# Patient Record
Sex: FEMALE | Race: BLACK OR AFRICAN AMERICAN | NOT HISPANIC OR LATINO | Employment: FULL TIME | URBAN - METROPOLITAN AREA
[De-identification: names, ages, dates, MRNs, and addresses within clinical notes are randomized per-mention and may not be internally consistent; named-entity substitution may affect disease eponyms.]

---

## 2023-05-08 DIAGNOSIS — I10 ACCELERATED HYPERTENSION: Primary | ICD-10-CM

## 2023-05-10 RX ORDER — METOPROLOL TARTRATE 25 MG/1
TABLET, FILM COATED ORAL
Qty: 180 TABLET | Refills: 1 | Status: SHIPPED | OUTPATIENT
Start: 2023-05-10

## 2023-05-10 RX ORDER — SPIRONOLACTONE 50 MG/1
TABLET, FILM COATED ORAL
Qty: 180 TABLET | Refills: 1 | Status: SHIPPED | OUTPATIENT
Start: 2023-05-10 | End: 2024-01-26 | Stop reason: SDUPTHER

## 2023-05-10 RX ORDER — HYDROCHLOROTHIAZIDE 50 MG/1
TABLET ORAL
Qty: 90 TABLET | Refills: 1 | Status: SHIPPED | OUTPATIENT
Start: 2023-05-10 | End: 2024-01-31 | Stop reason: SDUPTHER

## 2023-07-05 ENCOUNTER — PATIENT OUTREACH (OUTPATIENT)
Dept: PRIMARY CARE | Facility: CLINIC | Age: 63
End: 2023-07-05
Payer: COMMERCIAL

## 2023-07-05 NOTE — PROGRESS NOTES
Discharge Facility: [place] [address]  Discharge Diagnosis: Nausea/Vomiting  Admission Date: 7-2-2023  Discharge Date: 7-3-2023    PCP Appointment Date: 7-  Specialist Appointment Date: NA  Hospital Encounter and Summary: Linked   See discharge assessment below for further details    Engagement  Call Start Time: 1105 (7/5/2023 11:05 AM)    Medications  Medications reviewed with patient/caregiver?: Yes (No changes to her medication regime) (7/5/2023 11:05 AM)  Is the patient having any side effects they believe may be caused by any medication additions or changes?: No (7/5/2023 11:05 AM)  Does the patient have all medications ordered at discharge?: Not applicable (7/5/2023 11:05 AM)  Prescription Comments: No issues reported in regards to accessibility affordability adherence  Denies any  questions or concerns about their medications once they are home. Verbalizes an understanding regarding how to take their medications and which medications they should be taking.  Denies the need for any medication refills at this time. (7/5/2023 11:05 AM)  Is the patient taking all medications as directed (includes completed medication regime)?: Yes (7/5/2023 11:05 AM)  Care Management Interventions: Provided patient education (7/5/2023 11:05 AM)  Medication Comments: Patient denies any questions related to her medications. (7/5/2023 11:05 AM)    Appointments  Does the patient have a primary care provider?: Yes (confirmed PCP Dr. Albert Hodgson) (7/5/2023 11:05 AM)  Care Management Interventions: Verified appointment date/time/provider (scheduled for July 11) (7/5/2023 11:05 AM)  Has the patient kept scheduled appointments due by today?: Yes (7/5/2023 11:05 AM)  Care Management Interventions: Educated on importance of keeping appointment (Emphasized the importance of hospital follow up. This is a great way for you to connect with your provider to ensure you have safely transitioned home.If you have any questions or concerns prior to  "appointment, please call the PCP's office right away.) (2023 11:05 AM)    Self Management  What is the home health agency?: NA (2023 11:05 AM)  Has home health visited the patient within 72 hours of discharge?: Not applicable (2023 11:05 AM)  What Durable Medical Equipment (DME) was ordered?: Patient states they have  all the necessary equipment and supplies at home.  Patient has an insuling pump- diabetc supplies (2023 11:05 AM)    Patient Teaching  Does the patient have access to their discharge instructions?: No (at work) (2023 11:05 AM)  Care Management Interventions: Reviewed instructions with patient (2023 11:05 AM)  What is the patient's perception of their health status since discharge?: Improving (\"slowly\") (2023 11:05 AM)  Is the patient/caregiver able to teach back the hierarchy of who to call/visit for symptoms/problems? PCP, Specialist, Home Health nurse, Urgent Care, ED, 911: Yes (2023 11:05 AM)    Wrap Up  Wrap Up Additional Comments: Spoke w/ pt  Pt identified by name and . states she is improving \"slowly\"  continues with some nausea.  Blood sugar this morning on the low side 70. She had some yogurt and blood sugar appropriate now.  Denies feelings of hyper/ hypoglycemia.  States she has no additional questions related to gastroparesis diet -- small meals, avoid high-fat and high-fiber diet. Confirms she will attend the follow up appointment that was scheduled with her today. (2023 11:05 AM)  Call End Time: 1116 (2023 11:05 AM)          "

## 2023-07-06 ENCOUNTER — TELEMEDICINE (OUTPATIENT)
Dept: PRIMARY CARE | Facility: CLINIC | Age: 63
End: 2023-07-06
Payer: COMMERCIAL

## 2023-07-06 DIAGNOSIS — E11.22 TYPE 2 DIABETES MELLITUS WITH STAGE 3B CHRONIC KIDNEY DISEASE, WITH LONG-TERM CURRENT USE OF INSULIN (MULTI): ICD-10-CM

## 2023-07-06 DIAGNOSIS — N18.30 STAGE 3 CHRONIC KIDNEY DISEASE, UNSPECIFIED WHETHER STAGE 3A OR 3B CKD (MULTI): ICD-10-CM

## 2023-07-06 DIAGNOSIS — R10.84 GENERALIZED ABDOMINAL PAIN: ICD-10-CM

## 2023-07-06 DIAGNOSIS — R93.1 AGATSTON CORONARY ARTERY CALCIUM SCORE BETWEEN 200 AND 399: ICD-10-CM

## 2023-07-06 DIAGNOSIS — Z96.41 INSULIN PUMP IN PLACE: ICD-10-CM

## 2023-07-06 DIAGNOSIS — Z79.4 TYPE 2 DIABETES MELLITUS WITH STAGE 3B CHRONIC KIDNEY DISEASE, WITH LONG-TERM CURRENT USE OF INSULIN (MULTI): ICD-10-CM

## 2023-07-06 DIAGNOSIS — N18.32 TYPE 2 DIABETES MELLITUS WITH STAGE 3B CHRONIC KIDNEY DISEASE, WITH LONG-TERM CURRENT USE OF INSULIN (MULTI): ICD-10-CM

## 2023-07-06 DIAGNOSIS — E78.2 MIXED HYPERLIPIDEMIA: Primary | ICD-10-CM

## 2023-07-06 DIAGNOSIS — I10 BENIGN ESSENTIAL HYPERTENSION: ICD-10-CM

## 2023-07-06 PROBLEM — E78.5 HYPERLIPIDEMIA: Status: ACTIVE | Noted: 2023-07-06

## 2023-07-06 PROBLEM — N18.9 CKD (CHRONIC KIDNEY DISEASE): Status: ACTIVE | Noted: 2023-07-06

## 2023-07-06 PROBLEM — D64.9 ANEMIA: Status: ACTIVE | Noted: 2023-07-06

## 2023-07-06 PROBLEM — N25.81 SECONDARY HYPERPARATHYROIDISM OF RENAL ORIGIN (MULTI): Status: ACTIVE | Noted: 2023-07-06

## 2023-07-06 PROBLEM — E11.9 DIABETES MELLITUS (MULTI): Status: ACTIVE | Noted: 2023-07-06

## 2023-07-06 PROBLEM — R10.9 ABDOMINAL PAIN: Status: ACTIVE | Noted: 2023-07-06

## 2023-07-06 PROCEDURE — 99214 OFFICE O/P EST MOD 30 MIN: CPT | Performed by: INTERNAL MEDICINE

## 2023-07-06 RX ORDER — ATORVASTATIN CALCIUM 80 MG/1
80 TABLET, FILM COATED ORAL DAILY
COMMUNITY
Start: 2018-01-09 | End: 2024-01-26 | Stop reason: SDUPTHER

## 2023-07-06 RX ORDER — EMPAGLIFLOZIN 25 MG/1
25 TABLET, FILM COATED ORAL DAILY
COMMUNITY
Start: 2020-03-03 | End: 2024-01-26 | Stop reason: SDUPTHER

## 2023-07-06 RX ORDER — SEMAGLUTIDE 1.34 MG/ML
1 INJECTION, SOLUTION SUBCUTANEOUS
COMMUNITY
Start: 2018-06-28 | End: 2023-07-06 | Stop reason: SDUPTHER

## 2023-07-06 RX ORDER — SEMAGLUTIDE 2.68 MG/ML
2 INJECTION, SOLUTION SUBCUTANEOUS
COMMUNITY
Start: 2021-10-28 | End: 2023-10-11 | Stop reason: SDUPTHER

## 2023-07-06 RX ORDER — ALBUTEROL SULFATE 90 UG/1
1 AEROSOL, METERED RESPIRATORY (INHALATION) EVERY 6 HOURS PRN
COMMUNITY
Start: 2022-09-01 | End: 2024-01-26 | Stop reason: SDUPTHER

## 2023-07-06 ASSESSMENT — ENCOUNTER SYMPTOMS
EYE ITCHING: 0
TROUBLE SWALLOWING: 0
EYE DISCHARGE: 0
RHINORRHEA: 0
FATIGUE: 0
EYE REDNESS: 0
EYE REDNESS: 0
POLYPHAGIA: 0
DYSURIA: 0
MYALGIAS: 0
FREQUENCY: 0
HEADACHES: 1
STRIDOR: 0
CHEST TIGHTNESS: 0
TROUBLE SWALLOWING: 0
HEMATURIA: 0
SORE THROAT: 0
FLANK PAIN: 0
EYE ITCHING: 0
VOMITING: 1
BRUISES/BLEEDS EASILY: 0
JOINT SWELLING: 0
LIGHT-HEADEDNESS: 0
FACIAL ASYMMETRY: 0
ARTHRALGIAS: 0
VOICE CHANGE: 0
LIGHT-HEADEDNESS: 0
NECK PAIN: 0
PHOTOPHOBIA: 0
ABDOMINAL DISTENTION: 0
DYSURIA: 0
NECK STIFFNESS: 0
SPEECH DIFFICULTY: 0
COLOR CHANGE: 0
SEIZURES: 0
ADENOPATHY: 0
SEIZURES: 0
VOICE CHANGE: 0
FACIAL ASYMMETRY: 0
POLYDIPSIA: 0
ABDOMINAL PAIN: 1
SHORTNESS OF BREATH: 0
NUMBNESS: 0
PALPITATIONS: 0
EYE PAIN: 0
ARTHRALGIAS: 0
WOUND: 0
DIARRHEA: 0
DIARRHEA: 0
BLOOD IN STOOL: 0
CHOKING: 0
DIAPHORESIS: 0
WHEEZING: 0
POLYDIPSIA: 0
BLOOD IN STOOL: 0
JOINT SWELLING: 0
CONSTIPATION: 1
ANAL BLEEDING: 0
SINUS PRESSURE: 0
PALPITATIONS: 0
RECTAL PAIN: 0
BRUISES/BLEEDS EASILY: 0
BACK PAIN: 1
DIAPHORESIS: 0
CHILLS: 0
WOUND: 0
ACTIVITY CHANGE: 0
APPETITE CHANGE: 0
ADENOPATHY: 0
NECK PAIN: 0
DIZZINESS: 0
ABDOMINAL DISTENTION: 0
STRIDOR: 0
RHINORRHEA: 0
FREQUENCY: 0
HEMATURIA: 0
PHOTOPHOBIA: 0
DIFFICULTY URINATING: 0
POLYPHAGIA: 0
SHORTNESS OF BREATH: 0
FACIAL SWELLING: 0
TREMORS: 0
CHILLS: 0
SINUS PAIN: 0
COUGH: 0
TREMORS: 0
COUGH: 0
FATIGUE: 0
WHEEZING: 0
DIZZINESS: 0
SPEECH DIFFICULTY: 0
VOMITING: 0
COLOR CHANGE: 0
ABDOMINAL PAIN: 0
SLEEP DISTURBANCE: 0
SINUS PRESSURE: 0
DIFFICULTY URINATING: 0
APPETITE CHANGE: 0
CHOKING: 0
NAUSEA: 1
SLEEP DISTURBANCE: 0
MYALGIAS: 0
HEADACHES: 1
WEAKNESS: 0
NAUSEA: 1
BACK PAIN: 1
SINUS PAIN: 0
ACTIVITY CHANGE: 0
EYE DISCHARGE: 0
RECTAL PAIN: 0
NECK STIFFNESS: 0
EYE PAIN: 0
CONSTIPATION: 1
ANAL BLEEDING: 0
NUMBNESS: 0
SORE THROAT: 0
FLANK PAIN: 0
FACIAL SWELLING: 0
WEAKNESS: 0
CHEST TIGHTNESS: 0

## 2023-07-06 NOTE — PROGRESS NOTES
"Patient: Anna Santos  : 1960  PCP: Albert Hodgson MD  MRN: [id number]  Program: No linked episodes     Anna Santos is a 62 y.o. female presenting today for follow-up after being discharged from the hospital 3 days ago. The main problem requiring admission was nausea/vomiting and abdominal pain. The discharge summary and/or Transitional Care Management documentation was reviewed. Medication reconciliation was performed as indicated via the \"Woody as Reviewed\" timestamp.     Anna Santos was contacted by Transitional Care Management services two days after her discharge. This encounter and supporting documentation was reviewed.    The complexity of medical decision making for this patient's transitional care is high.  Patient presents for posthospitalization visit.  She was [facility] with abdominal pain, nausea vomiting and dizziness.  Her work-up was negative including abdominal pelvic CT.  Her symptoms improved with antiemetics.  Abdominal pain improved.  She has done well since has been at home.  She reports much improvement in her nausea and vomiting.  Her abdominal pain is improved.  She continues to complain of constipation.  She reports occasional headaches, no dizziness, no chest pain or shortness of breath.  She reports occasional back pain.  Physical Exam  Constitutional:       Appearance: Normal appearance.   Pulmonary:      Effort: Pulmonary effort is normal.      Breath sounds: Normal breath sounds.   Neurological:      Mental Status: She is alert.   Psychiatric:         Mood and Affect: Mood normal.         Behavior: Behavior normal.         Assessment/Plan   Diagnoses and all orders for this visit:  Mixed hyperlipidemia-we will continue with atorvastatin  Benign essential hypertension-schedule appointment for blood pressure check  Agatston coronary artery calcium score between 200 and 399-we will modify risk factors  Insulin pump in place  Type 2 diabetes mellitus " with stage 3b chronic kidney disease, with long-term current use of insulin (CMS/[facility])-hemoglobin A1c was 7.6.  Ophthalmology appointment has been done.  Follow-up with endocrinology  Generalized abdominal pain-she will schedule follow-up appointment with GI.  Stage 3 chronic kidney disease, unspecified whether stage 3a or 3b CKD (CMS/[facility])-follow-up with nephrology.  Creatinine has been stable  Health maintenance-colonoscopy has been done.  Mammogram is up-to-date.  Tetanus shot at next visit    Review of Systems   Constitutional:  Negative for activity change, appetite change, chills, diaphoresis and fatigue.   HENT:  Negative for congestion, dental problem, drooling, ear discharge, ear pain, facial swelling, hearing loss, mouth sores, nosebleeds, postnasal drip, rhinorrhea, sinus pressure, sinus pain, sneezing, sore throat, tinnitus, trouble swallowing and voice change.    Eyes:  Negative for photophobia, pain, discharge, redness, itching and visual disturbance.   Respiratory:  Negative for cough, choking, chest tightness, shortness of breath, wheezing and stridor.    Cardiovascular:  Negative for chest pain, palpitations and leg swelling.   Gastrointestinal:  Positive for abdominal pain, constipation and nausea. Negative for abdominal distention, anal bleeding, blood in stool, diarrhea, rectal pain and vomiting.   Endocrine: Negative for cold intolerance, heat intolerance, polydipsia, polyphagia and polyuria.   Genitourinary:  Negative for decreased urine volume, difficulty urinating, dysuria, enuresis, flank pain, frequency, genital sores, hematuria and urgency.   Musculoskeletal:  Positive for back pain. Negative for arthralgias, gait problem, joint swelling, myalgias, neck pain and neck stiffness.   Skin:  Negative for color change, pallor, rash and wound.   Neurological:  Positive for headaches. Negative for dizziness, tremors, seizures, syncope, facial asymmetry, speech difficulty, weakness, light-headedness  and numbness.   Hematological:  Negative for adenopathy. Does not bruise/bleed easily.   Psychiatric/Behavioral:  Negative for sleep disturbance.            Family History   Problem Relation Name Age of Onset    Dementia Mother      Diabetes Mother      Lung cancer Father         Engagement  Call Start Time: 1105 (7/5/2023 11:05 AM)    Medications  Medications reviewed with patient/caregiver?: Yes (No changes to her medication regime) (7/5/2023 11:05 AM)  Is the patient having any side effects they believe may be caused by any medication additions or changes?: No (7/5/2023 11:05 AM)  Does the patient have all medications ordered at discharge?: Not applicable (7/5/2023 11:05 AM)  Prescription Comments: No issues reported in regards to accessibility affordability adherence  Denies any  questions or concerns about their medications once they are home. Verbalizes an understanding regarding how to take their medications and which medications they should be taking.  Denies the need for any medication refills at this time. (7/5/2023 11:05 AM)  Is the patient taking all medications as directed (includes completed medication regime)?: Yes (7/5/2023 11:05 AM)  Care Management Interventions: Provided patient education (7/5/2023 11:05 AM)  Medication Comments: Patient denies any questions related to her medications. (7/5/2023 11:05 AM)    Appointments  Does the patient have a primary care provider?: Yes (confirmed PCP Dr. Albert Hodgson) (7/5/2023 11:05 AM)  Care Management Interventions: Verified appointment date/time/provider (scheduled for July 11) (7/5/2023 11:05 AM)  Has the patient kept scheduled appointments due by today?: Yes (7/5/2023 11:05 AM)  Care Management Interventions: Educated on importance of keeping appointment (Emphasized the importance of hospital follow up. This is a great way for you to connect with your provider to ensure you have safely transitioned home.If you have any questions or concerns prior to  "appointment, please call the PCP's office right away.) (2023 11:05 AM)    Self Management  What is the home health agency?: NA (2023 11:05 AM)  Has home health visited the patient within 72 hours of discharge?: Not applicable (2023 11:05 AM)  What Durable Medical Equipment (DME) was ordered?: Patient states they have  all the necessary equipment and supplies at home.  Patient has an insuling pump- diabetc supplies (2023 11:05 AM)    Patient Teaching  Does the patient have access to their discharge instructions?: No (at work) (2023 11:05 AM)  Care Management Interventions: Reviewed instructions with patient (2023 11:05 AM)  What is the patient's perception of their health status since discharge?: Improving (\"slowly\") (2023 11:05 AM)  Is the patient/caregiver able to teach back the hierarchy of who to call/visit for symptoms/problems? PCP, Specialist, Home Health nurse, Urgent Care, ED, 911: Yes (2023 11:05 AM)    Wrap Up  Wrap Up Additional Comments: Spoke w/ pt  Pt identified by name and . states she is improving \"slowly\"  continues with some nausea.  Blood sugar this morning on the low side 70. She had some yogurt and blood sugar appropriate now.  Denies feelings of hyper/ hypoglycemia.  States she has no additional questions related to gastroparesis diet -- small meals, avoid high-fat and high-fiber diet. Confirms she will attend the follow up appointment that was scheduled with her today. (2023 11:05 AM)  Call End Time: 1116 (2023 11:05 AM)        No follow-ups on file.  "

## 2023-07-06 NOTE — PROGRESS NOTES
"Patient: Anna Santos  : 1960  PCP: Albert Hodgson MD  MRN: [id number]  Program: No linked episodes     Anna Santos is a 62 y.o. female presenting today for follow-up after being discharged from the hospital 3 days ago. The main problem requiring admission was abdominal pain, nausea and vomiting the discharge summary and/or Transitional Care Management documentation was reviewed. Medication reconciliation was performed as indicated via the \"Woody as Reviewed\" timestamp.     Anna Santos was contacted by Transitional Care Management services two days after her discharge. This encounter and supporting documentation was reviewed.    The complexity of medical decision making for this patient's transitional care is high.  Patient presents for posthospitalization visit.  She was hospitalized [facility] with abdominal pain nausea and vomiting.  Her work-up was negative including a abdominal pelvic CT.  She was treated with antiemetics.  Abdominal pain gradually improved.  She has done well since she has been at home.  She reports improvement in her abdominal pain.  Her nausea vomiting have resolved.  She continues with constipation.  She reports occasional headaches, no dizziness, no chest pain or shortness of breath.  She does complain of occasional back pain.  Physical Exam  Constitutional:       Appearance: Normal appearance. She is normal weight.   Pulmonary:      Effort: Pulmonary effort is normal.   Neurological:      Mental Status: She is alert.   Psychiatric:         Mood and Affect: Mood normal.         Behavior: Behavior normal.         Thought Content: Thought content normal.       Assessment/Plan   Diagnoses and all orders for this visit:  Mixed hyperlipidemia-we will continue with atorvastatin  Agatston coronary artery calcium score between 200 and 399  Insulin pump in place-follow-up with endocrinology  Type 2 diabetes mellitus with stage 3b chronic kidney disease, with long-term " current use of insulin (CMS/[facility])-hemoglobin A1c was 7.6.  Ophthalmology appointment has been done.  Appointment next week for hemoglobin A1c  Generalized abdominal pain-symptoms are improved.  Schedule appointment with GI.?  Adhesions.  Health maintenance-mammogram has been done.  Colonoscopy is up-to-date.  Pap with GYN    Review of Systems   Constitutional:  Negative for activity change, appetite change, chills, diaphoresis and fatigue.   HENT:  Negative for congestion, dental problem, drooling, ear discharge, ear pain, facial swelling, hearing loss, mouth sores, nosebleeds, postnasal drip, rhinorrhea, sinus pressure, sinus pain, sneezing, sore throat, tinnitus, trouble swallowing and voice change.    Eyes:  Negative for photophobia, pain, discharge, redness, itching and visual disturbance.   Respiratory:  Negative for cough, choking, chest tightness, shortness of breath, wheezing and stridor.    Cardiovascular:  Negative for chest pain, palpitations and leg swelling.   Gastrointestinal:  Positive for constipation, nausea and vomiting. Negative for abdominal distention, abdominal pain, anal bleeding, blood in stool, diarrhea and rectal pain.   Endocrine: Negative for cold intolerance, heat intolerance, polydipsia, polyphagia and polyuria.   Genitourinary:  Negative for decreased urine volume, difficulty urinating, dysuria, enuresis, flank pain, frequency, genital sores, hematuria and urgency.   Musculoskeletal:  Positive for back pain. Negative for arthralgias, gait problem, joint swelling, myalgias, neck pain and neck stiffness.   Skin:  Negative for color change, pallor, rash and wound.   Neurological:  Positive for headaches. Negative for dizziness, tremors, seizures, syncope, facial asymmetry, speech difficulty, weakness, light-headedness and numbness.   Hematological:  Negative for adenopathy. Does not bruise/bleed easily.   Psychiatric/Behavioral:  Negative for sleep disturbance.        Family History    Problem Relation Name Age of Onset    Dementia Mother      Diabetes Mother      Lung cancer Father         Engagement  Call Start Time: 1105 (7/5/2023 11:05 AM)    Medications  Medications reviewed with patient/caregiver?: Yes (No changes to her medication regime) (7/5/2023 11:05 AM)  Is the patient having any side effects they believe may be caused by any medication additions or changes?: No (7/5/2023 11:05 AM)  Does the patient have all medications ordered at discharge?: Not applicable (7/5/2023 11:05 AM)  Prescription Comments: No issues reported in regards to accessibility affordability adherence  Denies any  questions or concerns about their medications once they are home. Verbalizes an understanding regarding how to take their medications and which medications they should be taking.  Denies the need for any medication refills at this time. (7/5/2023 11:05 AM)  Is the patient taking all medications as directed (includes completed medication regime)?: Yes (7/5/2023 11:05 AM)  Care Management Interventions: Provided patient education (7/5/2023 11:05 AM)  Medication Comments: Patient denies any questions related to her medications. (7/5/2023 11:05 AM)    Appointments  Does the patient have a primary care provider?: Yes (confirmed PCP Dr. Albert Hodgson) (7/5/2023 11:05 AM)  Care Management Interventions: Verified appointment date/time/provider (scheduled for July 11) (7/5/2023 11:05 AM)  Has the patient kept scheduled appointments due by today?: Yes (7/5/2023 11:05 AM)  Care Management Interventions: Educated on importance of keeping appointment (Emphasized the importance of hospital follow up. This is a great way for you to connect with your provider to ensure you have safely transitioned home.If you have any questions or concerns prior to appointment, please call the PCP's office right away.) (7/5/2023 11:05 AM)    Self Management  What is the home health agency?: NA (7/5/2023 11:05 AM)  Has home health visited  "the patient within 72 hours of discharge?: Not applicable (2023 11:05 AM)  What Durable Medical Equipment (DME) was ordered?: Patient states they have  all the necessary equipment and supplies at home.  Patient has an insuling pump- diabetc supplies (2023 11:05 AM)    Patient Teaching  Does the patient have access to their discharge instructions?: No (at work) (2023 11:05 AM)  Care Management Interventions: Reviewed instructions with patient (2023 11:05 AM)  What is the patient's perception of their health status since discharge?: Improving (\"slowly\") (2023 11:05 AM)  Is the patient/caregiver able to teach back the hierarchy of who to call/visit for symptoms/problems? PCP, Specialist, Home Health nurse, Urgent Care, ED, 911: Yes (2023 11:05 AM)    Wrap Up  Wrap Up Additional Comments: Spoke w/ pt  Pt identified by name and . states she is improving \"slowly\"  continues with some nausea.  Blood sugar this morning on the low side 70. She had some yogurt and blood sugar appropriate now.  Denies feelings of hyper/ hypoglycemia.  States she has no additional questions related to gastroparesis diet -- small meals, avoid high-fat and high-fiber diet. Confirms she will attend the follow up appointment that was scheduled with her today. (2023 11:05 AM)  Call End Time: 1116 (2023 11:05 AM)        No follow-ups on file.  "

## 2023-07-06 NOTE — PATIENT INSTRUCTIONS
Please take medication as prescribed.  Schedule appointment with GI.  Follow-up in 1 week as scheduled.

## 2023-07-08 DIAGNOSIS — R11.2 NAUSEA AND VOMITING, UNSPECIFIED VOMITING TYPE: ICD-10-CM

## 2023-07-08 DIAGNOSIS — R11.2 NAUSEA AND VOMITING, UNSPECIFIED VOMITING TYPE: Primary | ICD-10-CM

## 2023-07-08 RX ORDER — ONDANSETRON HYDROCHLORIDE 8 MG/1
8 TABLET, FILM COATED ORAL EVERY 8 HOURS PRN
Qty: 12 TABLET | Refills: 0 | Status: SHIPPED | OUTPATIENT
Start: 2023-07-08 | End: 2023-07-08 | Stop reason: SDUPTHER

## 2023-07-08 RX ORDER — ONDANSETRON HYDROCHLORIDE 8 MG/1
8 TABLET, FILM COATED ORAL EVERY 8 HOURS PRN
Qty: 12 TABLET | Refills: 0 | Status: SHIPPED | OUTPATIENT
Start: 2023-07-08 | End: 2024-01-26 | Stop reason: WASHOUT

## 2023-07-08 NOTE — PROGRESS NOTES
Mrs. Santos daughter called noting that her mother once again started to have nausea and vomiting.  Her blood glucose is 100 checked an hour ago.  She is currently off of her insulin pump.  She was hospitalized last week for similar symptoms and seen by gastroenterology.  I read the discharge summary and the GI consult.  I agree with the differential diagnosis of the gastroenterology consult.    For treatment, we will try to avoid having to send her back to the emergency room.  I prescribed ondansetron 8 mg 3 times daily as needed for nausea.  I also recommended that they  an over-the-counter PPI such as Prilosec OTC to take once daily.  I did read that she had a prior history of esophagitis and gastritis found on endoscopy.    Close follow-up will be needed in the outpatient setting.  She should return to the emergency room if she is unable to keep down liquids or solids.  A repeat phone call for failure to improve is recommended.    Total time spent in consultation with the patient, family, and documentation is 12 minutes

## 2023-07-11 ENCOUNTER — OFFICE VISIT (OUTPATIENT)
Dept: PRIMARY CARE | Facility: CLINIC | Age: 63
End: 2023-07-11
Payer: COMMERCIAL

## 2023-07-11 VITALS
SYSTOLIC BLOOD PRESSURE: 132 MMHG | HEART RATE: 78 BPM | DIASTOLIC BLOOD PRESSURE: 78 MMHG | WEIGHT: 230 LBS | BODY MASS INDEX: 32.08 KG/M2

## 2023-07-11 DIAGNOSIS — R10.84 GENERALIZED ABDOMINAL PAIN: ICD-10-CM

## 2023-07-11 DIAGNOSIS — E78.2 MIXED HYPERLIPIDEMIA: Primary | ICD-10-CM

## 2023-07-11 DIAGNOSIS — R93.1 AGATSTON CORONARY ARTERY CALCIUM SCORE BETWEEN 200 AND 399: ICD-10-CM

## 2023-07-11 DIAGNOSIS — Z12.31 SCREENING MAMMOGRAM FOR BREAST CANCER: ICD-10-CM

## 2023-07-11 DIAGNOSIS — I10 BENIGN ESSENTIAL HYPERTENSION: ICD-10-CM

## 2023-07-11 DIAGNOSIS — Z00.00 HEALTH CARE MAINTENANCE: ICD-10-CM

## 2023-07-11 DIAGNOSIS — Z79.4 TYPE 2 DIABETES MELLITUS WITH STAGE 3B CHRONIC KIDNEY DISEASE, WITH LONG-TERM CURRENT USE OF INSULIN (MULTI): ICD-10-CM

## 2023-07-11 DIAGNOSIS — E11.22 TYPE 2 DIABETES MELLITUS WITH STAGE 3B CHRONIC KIDNEY DISEASE, WITH LONG-TERM CURRENT USE OF INSULIN (MULTI): ICD-10-CM

## 2023-07-11 DIAGNOSIS — N18.32 TYPE 2 DIABETES MELLITUS WITH STAGE 3B CHRONIC KIDNEY DISEASE, WITH LONG-TERM CURRENT USE OF INSULIN (MULTI): ICD-10-CM

## 2023-07-11 LAB
POC FINGERSTICK BLOOD GLUCOSE: 155 MG/DL (ref 70–100)
POC HEMOGLOBIN A1C: 6.9 % (ref 4.2–6.5)

## 2023-07-11 PROCEDURE — 99213 OFFICE O/P EST LOW 20 MIN: CPT | Performed by: INTERNAL MEDICINE

## 2023-07-11 PROCEDURE — 3078F DIAST BP <80 MM HG: CPT | Performed by: INTERNAL MEDICINE

## 2023-07-11 PROCEDURE — 3075F SYST BP GE 130 - 139MM HG: CPT | Performed by: INTERNAL MEDICINE

## 2023-07-11 PROCEDURE — 83036 HEMOGLOBIN GLYCOSYLATED A1C: CPT | Performed by: INTERNAL MEDICINE

## 2023-07-11 PROCEDURE — 1036F TOBACCO NON-USER: CPT | Performed by: INTERNAL MEDICINE

## 2023-07-11 PROCEDURE — 82962 GLUCOSE BLOOD TEST: CPT | Performed by: INTERNAL MEDICINE

## 2023-07-11 PROCEDURE — 3066F NEPHROPATHY DOC TX: CPT | Performed by: INTERNAL MEDICINE

## 2023-07-11 RX ORDER — INSULIN HUMAN 500 [IU]/ML
INJECTION, SOLUTION SUBCUTANEOUS
COMMUNITY
Start: 2014-06-27

## 2023-07-11 RX ORDER — ASPIRIN 81 MG/1
81 TABLET ORAL
COMMUNITY

## 2023-07-11 ASSESSMENT — ENCOUNTER SYMPTOMS
SINUS PRESSURE: 0
SINUS PAIN: 0
ANAL BLEEDING: 0
DIFFICULTY URINATING: 0
FACIAL SWELLING: 0
DYSURIA: 0
FREQUENCY: 0
CHEST TIGHTNESS: 0
SPEECH DIFFICULTY: 0
NAUSEA: 0
TROUBLE SWALLOWING: 0
SHORTNESS OF BREATH: 0
HYPERTENSION: 1
POLYPHAGIA: 0
CONSTIPATION: 0
ARTHRALGIAS: 0
PHOTOPHOBIA: 0
DIZZINESS: 0
EYE PAIN: 0
HEMATURIA: 0
STRIDOR: 0
DIARRHEA: 0
EYE REDNESS: 0
DIAPHORESIS: 0
RHINORRHEA: 0
NECK PAIN: 0
BLOOD IN STOOL: 0
PALPITATIONS: 0
ABDOMINAL PAIN: 0
HEADACHES: 0
NECK STIFFNESS: 0
WOUND: 0
VOICE CHANGE: 0
ABDOMINAL DISTENTION: 0
COUGH: 0
ACTIVITY CHANGE: 0
EYE ITCHING: 0
LIGHT-HEADEDNESS: 0
BACK PAIN: 1
APPETITE CHANGE: 0
FATIGUE: 0
SEIZURES: 0
JOINT SWELLING: 0
ADENOPATHY: 0
MYALGIAS: 0
EYE DISCHARGE: 0
CHILLS: 0
WHEEZING: 0
COLOR CHANGE: 0
CHOKING: 0
POLYDIPSIA: 0
VOMITING: 0
FLANK PAIN: 0
FACIAL ASYMMETRY: 0
SORE THROAT: 0
SLEEP DISTURBANCE: 0
WEAKNESS: 0
BRUISES/BLEEDS EASILY: 0
NUMBNESS: 0
RECTAL PAIN: 0
TREMORS: 0

## 2023-07-11 NOTE — PROGRESS NOTES
Subjective   Patient ID: Anna Santos is a 62 y.o. female who presents for Hypertension and Diabetes.    Patient presents for follow-up.  She reports improvement in her abdominal pain.  She had nausea over the weekend but was given antinausea medication and symptoms have resolved.  She otherwise feels okay.  She denies any headaches, no dizziness, no chest pain or shortness of breath..  She does complain of allergy symptoms she she denies further abdominal pain no nausea vomiting or diarrhea.   She reports occasional right shoulder pain..      Hypertension  Pertinent negatives include no chest pain, headaches, neck pain, palpitations or shortness of breath.   Diabetes  Pertinent negatives for hypoglycemia include no dizziness, headaches, pallor, seizures, speech difficulty or tremors. Pertinent negatives for diabetes include no chest pain, no fatigue, no polydipsia, no polyphagia, no polyuria and no weakness.   When I    Review of Systems   Constitutional:  Negative for activity change, appetite change, chills, diaphoresis and fatigue.   HENT:  Positive for congestion. Negative for dental problem, drooling, ear discharge, ear pain, facial swelling, hearing loss, mouth sores, nosebleeds, postnasal drip, rhinorrhea, sinus pressure, sinus pain, sneezing, sore throat, tinnitus, trouble swallowing and voice change.    Eyes:  Negative for photophobia, pain, discharge, redness, itching and visual disturbance.   Respiratory:  Negative for cough, choking, chest tightness, shortness of breath, wheezing and stridor.    Cardiovascular:  Negative for chest pain, palpitations and leg swelling.   Gastrointestinal:  Negative for abdominal distention, abdominal pain, anal bleeding, blood in stool, constipation, diarrhea, nausea, rectal pain and vomiting.   Endocrine: Negative for cold intolerance, heat intolerance, polydipsia, polyphagia and polyuria.   Genitourinary:  Negative for decreased urine volume, difficulty urinating,  dysuria, enuresis, flank pain, frequency, genital sores, hematuria and urgency.   Musculoskeletal:  Positive for back pain. Negative for arthralgias, gait problem, joint swelling, myalgias, neck pain and neck stiffness.   Skin:  Negative for color change, pallor, rash and wound.   Neurological:  Negative for dizziness, tremors, seizures, syncope, facial asymmetry, speech difficulty, weakness, light-headedness, numbness and headaches.   Hematological:  Negative for adenopathy. Does not bruise/bleed easily.   Psychiatric/Behavioral:  Negative for sleep disturbance.        Objective   /78   Pulse 78   Wt 104 kg (230 lb)   BMI 32.08 kg/m²     Physical Exam  Constitutional:       Appearance: Normal appearance. She is not diaphoretic.   Cardiovascular:      Rate and Rhythm: Normal rate and regular rhythm.      Heart sounds: No murmur heard.     No gallop.   Pulmonary:      Effort: No respiratory distress.      Breath sounds: No wheezing or rales.   Abdominal:      General: There is no distension.      Palpations: There is no mass.      Tenderness: There is no abdominal tenderness. There is no guarding.   Musculoskeletal:      Right lower leg: No edema.      Left lower leg: No edema.   Neurological:      Mental Status: She is alert.         Assessment/Plan   Diagnoses and all orders for this visit:  Mixed hyperlipidemia check a fasting lipid profile-  Type 2 diabetes mellitus with stage 3b chronic kidney disease, with long-term current use of insulin (CMS/[facility])-hemoglobin A1c was 6.9 we will continue her present management..  She will schedule an ophthalmology appointment  -     POCT glycosylated hemoglobin (Hb A1C) manually resulted  -     POCT Fingerstick Glucose manually resulted  Benign essential hypertension-stable on present medications  Agatston coronary artery calcium score between 200 and 399-we will modify risk factors  Generalized abdominal pain-keep appointment with GI.?  Related to [facility]  maintenance-we will schedule mammogram.  Colonoscopy is up-to-date.  Pap with GYN  -     CBC and Auto Differential; Future  -     Vitamin D, Total; Future  -     TSH with reflex to Free T4 if abnormal; Future  -     Uric Acid; Future  -     Urinalysis with Reflex Microscopic; Future  -     Albumin , Urine Random; Future  -     Comprehensive Metabolic Panel; Future  -     Lipid Panel; Future  Screening mammogram for breast cancer  -     BI mammo bilateral screening tomosynthesis; Future  Renal sufficiency-she will follow-up with nephrology.  Recheck creatinine

## 2023-07-18 ENCOUNTER — PATIENT OUTREACH (OUTPATIENT)
Dept: PRIMARY CARE | Facility: CLINIC | Age: 63
End: 2023-07-18
Payer: COMMERCIAL

## 2023-07-18 NOTE — PROGRESS NOTES
Call regarding appt. with PCP on  7-6-2023 after hospitalization.  At time of outreach call the patient feels as if their condition has improved.  They  deny any questions or concerns regarding  the recovery period  or follow up appointment,  Agreeable to continued outreach. They have my direct phone # and were encouraged to please reach out should they need any assistance prior to my next outreach.

## 2023-08-18 ENCOUNTER — PATIENT OUTREACH (OUTPATIENT)
Dept: PRIMARY CARE | Facility: CLINIC | Age: 63
End: 2023-08-18
Payer: COMMERCIAL

## 2023-09-13 ENCOUNTER — TELEPHONE (OUTPATIENT)
Dept: PRIMARY CARE | Facility: CLINIC | Age: 63
End: 2023-09-13

## 2023-09-13 NOTE — TELEPHONE ENCOUNTER
Patient needs a refill on her Zofran sent to the [facility] pharmacy on [address]

## 2023-09-29 ENCOUNTER — PATIENT OUTREACH (OUTPATIENT)
Dept: PRIMARY CARE | Facility: CLINIC | Age: 63
End: 2023-09-29
Payer: COMMERCIAL

## 2023-10-11 DIAGNOSIS — Z79.4 TYPE 2 DIABETES MELLITUS WITH STAGE 3B CHRONIC KIDNEY DISEASE, WITH LONG-TERM CURRENT USE OF INSULIN (MULTI): Primary | ICD-10-CM

## 2023-10-11 DIAGNOSIS — N18.32 TYPE 2 DIABETES MELLITUS WITH STAGE 3B CHRONIC KIDNEY DISEASE, WITH LONG-TERM CURRENT USE OF INSULIN (MULTI): Primary | ICD-10-CM

## 2023-10-11 DIAGNOSIS — E11.22 TYPE 2 DIABETES MELLITUS WITH STAGE 3B CHRONIC KIDNEY DISEASE, WITH LONG-TERM CURRENT USE OF INSULIN (MULTI): Primary | ICD-10-CM

## 2023-10-11 RX ORDER — SEMAGLUTIDE 2.68 MG/ML
2 INJECTION, SOLUTION SUBCUTANEOUS
Qty: 9 ML | Refills: 3 | Status: SHIPPED | OUTPATIENT
Start: 2023-10-11 | End: 2023-10-30 | Stop reason: SDUPTHER

## 2023-10-11 NOTE — PROGRESS NOTES
Patient ID: Anna Santos is a 63 y.o. female who presents for No chief complaint on file..  HPI  ***    ROS  ***    Objective   Physical Exam  ***    Assessment/Plan     ***    {Assess/[email or web address]:77874}

## 2023-10-30 DIAGNOSIS — E11.22 TYPE 2 DIABETES MELLITUS WITH STAGE 3B CHRONIC KIDNEY DISEASE, WITH LONG-TERM CURRENT USE OF INSULIN (MULTI): ICD-10-CM

## 2023-10-30 DIAGNOSIS — N18.32 TYPE 2 DIABETES MELLITUS WITH STAGE 3B CHRONIC KIDNEY DISEASE, WITH LONG-TERM CURRENT USE OF INSULIN (MULTI): ICD-10-CM

## 2023-10-30 DIAGNOSIS — Z79.4 TYPE 2 DIABETES MELLITUS WITH STAGE 3B CHRONIC KIDNEY DISEASE, WITH LONG-TERM CURRENT USE OF INSULIN (MULTI): ICD-10-CM

## 2023-10-30 RX ORDER — SEMAGLUTIDE 2.68 MG/ML
2 INJECTION, SOLUTION SUBCUTANEOUS
Qty: 9 ML | Refills: 3 | Status: SHIPPED | OUTPATIENT
Start: 2023-10-30 | End: 2023-11-07 | Stop reason: SDUPTHER

## 2023-11-05 PROBLEM — N89.8 VAGINAL IRRITATION: Status: ACTIVE | Noted: 2023-11-05

## 2023-11-05 PROBLEM — L97.514 ULCER OF TOE OF RIGHT FOOT, WITH NECROSIS OF BONE (MULTI): Status: ACTIVE | Noted: 2019-03-28

## 2023-11-05 PROBLEM — E83.52 HYPERCALCEMIA: Status: ACTIVE | Noted: 2023-11-05

## 2023-11-05 PROBLEM — N28.9 RENAL/URETERAL DISEASE: Status: ACTIVE | Noted: 2023-11-05

## 2023-11-05 PROBLEM — B37.31 YEAST VAGINITIS: Status: ACTIVE | Noted: 2023-11-05

## 2023-11-05 PROBLEM — N28.1 RENAL CYST: Status: ACTIVE | Noted: 2023-11-05

## 2023-11-05 PROBLEM — S46.011A TRAUMATIC TEAR OF RIGHT ROTATOR CUFF: Status: ACTIVE | Noted: 2023-11-05

## 2023-11-05 PROBLEM — K58.9 SPASTIC COLON: Status: ACTIVE | Noted: 2023-11-05

## 2023-11-05 PROBLEM — R10.9 ABDOMINAL PAIN: Status: RESOLVED | Noted: 2023-07-06 | Resolved: 2023-11-05

## 2023-11-05 PROBLEM — K56.50 INTESTINAL ADHESIONS WITH OBSTRUCTION (MULTI): Status: ACTIVE | Noted: 2023-11-05

## 2023-11-05 PROBLEM — S20.419A ABRASION OF BACK: Status: ACTIVE | Noted: 2023-11-05

## 2023-11-05 PROBLEM — S49.90XA SHOULDER INJURY: Status: ACTIVE | Noted: 2023-11-05

## 2023-11-05 PROBLEM — R44.8 FACIAL PRESSURE: Status: ACTIVE | Noted: 2023-11-05

## 2023-11-05 PROBLEM — K80.20 GALLSTONES: Status: ACTIVE | Noted: 2023-11-05

## 2023-11-05 PROBLEM — E11.21 DIABETIC NEPHROPATHY (MULTI): Status: ACTIVE | Noted: 2023-11-05

## 2023-11-05 PROBLEM — R80.9 PROTEINURIA: Status: ACTIVE | Noted: 2023-11-05

## 2023-11-05 PROBLEM — K76.9 HEPATIC LESION: Status: ACTIVE | Noted: 2023-11-05

## 2023-11-05 PROBLEM — J34.89 NASAL CRUSTING: Status: ACTIVE | Noted: 2023-11-05

## 2023-11-05 PROBLEM — R31.9 HEMATURIA: Status: ACTIVE | Noted: 2023-11-05

## 2023-11-05 PROBLEM — M70.60 GREATER TROCHANTERIC BURSITIS: Status: ACTIVE | Noted: 2023-11-05

## 2023-11-05 PROBLEM — R19.00 PELVIC MASS: Status: ACTIVE | Noted: 2023-11-05

## 2023-11-05 PROBLEM — K56.609 OBSTRUCTION OF BOWEL (MULTI): Status: ACTIVE | Noted: 2023-11-05

## 2023-11-05 PROBLEM — R04.0 EPISTAXIS: Status: ACTIVE | Noted: 2023-11-05

## 2023-11-05 PROBLEM — M54.30 SCIATICA: Status: ACTIVE | Noted: 2023-11-05

## 2023-11-05 RX ORDER — TRAMADOL HYDROCHLORIDE 50 MG/1
50 TABLET ORAL EVERY 4 HOURS PRN
COMMUNITY
Start: 2021-02-18

## 2023-11-05 RX ORDER — FLUCONAZOLE 150 MG/1
1 TABLET ORAL ONCE
COMMUNITY
Start: 2022-08-02 | End: 2024-01-26 | Stop reason: WASHOUT

## 2023-11-05 RX ORDER — PANTOPRAZOLE SODIUM 40 MG/1
1 TABLET, DELAYED RELEASE ORAL
COMMUNITY
Start: 2023-07-18

## 2023-11-05 RX ORDER — FLUTICASONE PROPIONATE 50 MCG
1 SPRAY, SUSPENSION (ML) NASAL 2 TIMES DAILY
COMMUNITY
Start: 2019-01-02

## 2023-11-05 RX ORDER — BLOOD-GLUCOSE SENSOR
EACH MISCELLANEOUS
COMMUNITY
Start: 2023-08-29

## 2023-11-05 RX ORDER — BLOOD-GLUCOSE,RECEIVER,CONT
EACH MISCELLANEOUS
COMMUNITY
Start: 2023-08-30 | End: 2023-12-26

## 2023-11-05 RX ORDER — AMOXICILLIN AND CLAVULANATE POTASSIUM 500; 125 MG/1; MG/1
1 TABLET, FILM COATED ORAL EVERY 12 HOURS
COMMUNITY
Start: 2022-08-02 | End: 2024-01-26 | Stop reason: ALTCHOICE

## 2023-11-05 RX ORDER — LANOLIN ALCOHOL/MO/W.PET/CERES
1 CREAM (GRAM) TOPICAL DAILY
COMMUNITY

## 2023-11-05 RX ORDER — METOPROLOL TARTRATE 25 MG/1
25 TABLET, FILM COATED ORAL 2 TIMES DAILY
COMMUNITY
End: 2024-01-26 | Stop reason: SDUPTHER

## 2023-11-05 RX ORDER — POLYETHYLENE GLYCOL 3350 17 G/17G
1 POWDER, FOR SOLUTION ORAL NIGHTLY
COMMUNITY

## 2023-11-05 RX ORDER — HYOSCYAMINE SULFATE 0.12 MG/1
0.12 TABLET, ORALLY DISINTEGRATING ORAL 3 TIMES DAILY
COMMUNITY
Start: 2021-06-03

## 2023-11-05 RX ORDER — BLOOD-GLUCOSE TRANSMITTER
EACH MISCELLANEOUS
COMMUNITY
Start: 2023-08-29

## 2023-11-05 RX ORDER — MUPIROCIN 20 MG/G
OINTMENT TOPICAL
COMMUNITY
Start: 2023-01-17

## 2023-11-05 RX ORDER — SYRINGE-NEEDLE,INSULIN,0.5 ML 27GX1/2"
SYRINGE, EMPTY DISPOSABLE MISCELLANEOUS
COMMUNITY

## 2023-11-05 RX ORDER — BLOOD SUGAR DIAGNOSTIC
STRIP MISCELLANEOUS
COMMUNITY
Start: 2018-05-24 | End: 2024-01-26 | Stop reason: SDUPTHER

## 2023-11-05 RX ORDER — SEMAGLUTIDE 1.34 MG/ML
0.25 INJECTION, SOLUTION SUBCUTANEOUS
COMMUNITY

## 2023-11-05 RX ORDER — SEMAGLUTIDE 1.34 MG/ML
1 INJECTION, SOLUTION SUBCUTANEOUS
COMMUNITY
Start: 2021-10-28 | End: 2023-12-04 | Stop reason: SDUPTHER

## 2023-11-05 RX ORDER — SUBCUTANEOUS INSULIN PUMP
EACH MISCELLANEOUS
COMMUNITY

## 2023-11-05 RX ORDER — ACETAMINOPHEN 500 MG
TABLET ORAL
COMMUNITY

## 2023-11-05 RX ORDER — PROPRANOLOL/HYDROCHLOROTHIAZID 40 MG-25MG
1 TABLET ORAL DAILY
COMMUNITY
End: 2024-03-19

## 2023-11-05 RX ORDER — BUDESONIDE AND FORMOTEROL FUMARATE DIHYDRATE 160; 4.5 UG/1; UG/1
2 AEROSOL RESPIRATORY (INHALATION) 2 TIMES DAILY
COMMUNITY
Start: 2022-09-14

## 2023-11-07 ENCOUNTER — OFFICE VISIT (OUTPATIENT)
Dept: ENDOCRINOLOGY | Facility: CLINIC | Age: 63
End: 2023-11-07
Payer: COMMERCIAL

## 2023-11-07 DIAGNOSIS — N18.32 TYPE 2 DIABETES MELLITUS WITH STAGE 3B CHRONIC KIDNEY DISEASE, WITH LONG-TERM CURRENT USE OF INSULIN (MULTI): Primary | ICD-10-CM

## 2023-11-07 DIAGNOSIS — E11.22 TYPE 2 DIABETES MELLITUS WITH STAGE 3B CHRONIC KIDNEY DISEASE, WITH LONG-TERM CURRENT USE OF INSULIN (MULTI): Primary | ICD-10-CM

## 2023-11-07 DIAGNOSIS — Z79.4 TYPE 2 DIABETES MELLITUS WITH STAGE 3B CHRONIC KIDNEY DISEASE, WITH LONG-TERM CURRENT USE OF INSULIN (MULTI): Primary | ICD-10-CM

## 2023-11-07 PROCEDURE — 1036F TOBACCO NON-USER: CPT | Performed by: INTERNAL MEDICINE

## 2023-11-07 PROCEDURE — 99024 POSTOP FOLLOW-UP VISIT: CPT | Performed by: INTERNAL MEDICINE

## 2023-11-07 PROCEDURE — 3078F DIAST BP <80 MM HG: CPT | Performed by: INTERNAL MEDICINE

## 2023-11-07 PROCEDURE — 3075F SYST BP GE 130 - 139MM HG: CPT | Performed by: INTERNAL MEDICINE

## 2023-11-07 PROCEDURE — 3066F NEPHROPATHY DOC TX: CPT | Performed by: INTERNAL MEDICINE

## 2023-11-07 RX ORDER — SEMAGLUTIDE 2.68 MG/ML
2 INJECTION, SOLUTION SUBCUTANEOUS
Qty: 1 ML | Refills: 0 | COMMUNITY
Start: 2023-11-07 | End: 2024-01-26 | Stop reason: DRUGHIGH

## 2023-12-04 DIAGNOSIS — E11.22 TYPE 2 DIABETES MELLITUS WITH STAGE 3B CHRONIC KIDNEY DISEASE, WITH LONG-TERM CURRENT USE OF INSULIN (MULTI): ICD-10-CM

## 2023-12-04 DIAGNOSIS — N18.32 TYPE 2 DIABETES MELLITUS WITH STAGE 3B CHRONIC KIDNEY DISEASE, WITH LONG-TERM CURRENT USE OF INSULIN (MULTI): ICD-10-CM

## 2023-12-04 DIAGNOSIS — Z79.4 TYPE 2 DIABETES MELLITUS WITH STAGE 3B CHRONIC KIDNEY DISEASE, WITH LONG-TERM CURRENT USE OF INSULIN (MULTI): ICD-10-CM

## 2023-12-04 RX ORDER — SEMAGLUTIDE 1.34 MG/ML
1 INJECTION, SOLUTION SUBCUTANEOUS
Qty: 9 ML | Refills: 2 | Status: SHIPPED | OUTPATIENT
Start: 2023-12-04

## 2023-12-19 ENCOUNTER — APPOINTMENT (OUTPATIENT)
Dept: ENDOCRINOLOGY | Facility: CLINIC | Age: 63
End: 2023-12-19
Payer: COMMERCIAL

## 2023-12-26 DIAGNOSIS — E11.9 TYPE 2 DIABETES MELLITUS WITHOUT COMPLICATIONS (MULTI): ICD-10-CM

## 2023-12-26 RX ORDER — BLOOD-GLUCOSE,RECEIVER,CONT
EACH MISCELLANEOUS
Qty: 3 EACH | Refills: 11 | Status: SHIPPED | OUTPATIENT
Start: 2023-12-26

## 2024-01-26 ENCOUNTER — OFFICE VISIT (OUTPATIENT)
Dept: PRIMARY CARE | Facility: CLINIC | Age: 64
End: 2024-01-26
Payer: COMMERCIAL

## 2024-01-26 ENCOUNTER — LAB (OUTPATIENT)
Dept: LAB | Facility: LAB | Age: 64
End: 2024-01-26
Payer: COMMERCIAL

## 2024-01-26 ENCOUNTER — APPOINTMENT (OUTPATIENT)
Dept: PRIMARY CARE | Facility: CLINIC | Age: 64
End: 2024-01-26
Payer: COMMERCIAL

## 2024-01-26 VITALS
HEART RATE: 72 BPM | WEIGHT: 230.5 LBS | BODY MASS INDEX: 32.15 KG/M2 | SYSTOLIC BLOOD PRESSURE: 110 MMHG | DIASTOLIC BLOOD PRESSURE: 74 MMHG

## 2024-01-26 DIAGNOSIS — N18.30 STAGE 3 CHRONIC KIDNEY DISEASE, UNSPECIFIED WHETHER STAGE 3A OR 3B CKD (MULTI): ICD-10-CM

## 2024-01-26 DIAGNOSIS — E78.2 MIXED HYPERLIPIDEMIA: ICD-10-CM

## 2024-01-26 DIAGNOSIS — Z00.00 HEALTH CARE MAINTENANCE: ICD-10-CM

## 2024-01-26 DIAGNOSIS — E10.36: ICD-10-CM

## 2024-01-26 DIAGNOSIS — E11.22 TYPE 2 DIABETES MELLITUS WITH STAGE 3B CHRONIC KIDNEY DISEASE, WITH LONG-TERM CURRENT USE OF INSULIN (MULTI): ICD-10-CM

## 2024-01-26 DIAGNOSIS — R93.1 AGATSTON CORONARY ARTERY CALCIUM SCORE BETWEEN 200 AND 399: ICD-10-CM

## 2024-01-26 DIAGNOSIS — E21.3 HYPERPARATHYROIDISM, UNSPECIFIED (MULTI): ICD-10-CM

## 2024-01-26 DIAGNOSIS — D64.9 ANEMIA, UNSPECIFIED TYPE: ICD-10-CM

## 2024-01-26 DIAGNOSIS — Z12.31 SCREENING MAMMOGRAM FOR BREAST CANCER: Primary | ICD-10-CM

## 2024-01-26 DIAGNOSIS — Z96.41 INSULIN PUMP IN PLACE: ICD-10-CM

## 2024-01-26 DIAGNOSIS — I10 BENIGN ESSENTIAL HYPERTENSION: ICD-10-CM

## 2024-01-26 DIAGNOSIS — N18.32 TYPE 2 DIABETES MELLITUS WITH STAGE 3B CHRONIC KIDNEY DISEASE, WITH LONG-TERM CURRENT USE OF INSULIN (MULTI): ICD-10-CM

## 2024-01-26 DIAGNOSIS — J45.20 MILD INTERMITTENT ASTHMA, UNSPECIFIED WHETHER COMPLICATED (HHS-HCC): ICD-10-CM

## 2024-01-26 DIAGNOSIS — Z79.4 TYPE 2 DIABETES MELLITUS WITH STAGE 3B CHRONIC KIDNEY DISEASE, WITH LONG-TERM CURRENT USE OF INSULIN (MULTI): ICD-10-CM

## 2024-01-26 DIAGNOSIS — I10 ACCELERATED HYPERTENSION: ICD-10-CM

## 2024-01-26 DIAGNOSIS — R10.12 LEFT UPPER QUADRANT ABDOMINAL PAIN: ICD-10-CM

## 2024-01-26 LAB
25(OH)D3 SERPL-MCNC: 62 NG/ML (ref 30–100)
ALBUMIN SERPL BCP-MCNC: 3.9 G/DL (ref 3.4–5)
ALP SERPL-CCNC: 43 U/L (ref 33–136)
ALT SERPL W P-5'-P-CCNC: 39 U/L (ref 7–45)
ANION GAP SERPL CALC-SCNC: 16 MMOL/L (ref 10–20)
AST SERPL W P-5'-P-CCNC: 39 U/L (ref 9–39)
BASOPHILS # BLD AUTO: 0.06 X10*3/UL (ref 0–0.1)
BASOPHILS NFR BLD AUTO: 1 %
BILIRUB SERPL-MCNC: 0.5 MG/DL (ref 0–1.2)
BUN SERPL-MCNC: 26 MG/DL (ref 6–23)
CALCIUM SERPL-MCNC: 10.1 MG/DL (ref 8.6–10.6)
CHLORIDE SERPL-SCNC: 103 MMOL/L (ref 98–107)
CHOLEST SERPL-MCNC: 168 MG/DL (ref 0–199)
CHOLESTEROL/HDL RATIO: 4
CO2 SERPL-SCNC: 23 MMOL/L (ref 21–32)
CREAT SERPL-MCNC: 1.55 MG/DL (ref 0.5–1.05)
EGFRCR SERPLBLD CKD-EPI 2021: 37 ML/MIN/1.73M*2
EOSINOPHIL # BLD AUTO: 0.27 X10*3/UL (ref 0–0.7)
EOSINOPHIL NFR BLD AUTO: 4.5 %
ERYTHROCYTE [DISTWIDTH] IN BLOOD BY AUTOMATED COUNT: 14.3 % (ref 11.5–14.5)
GLUCOSE SERPL-MCNC: 62 MG/DL (ref 74–99)
HCT VFR BLD AUTO: 43.9 % (ref 36–46)
HDLC SERPL-MCNC: 41.5 MG/DL
HGB BLD-MCNC: 13.4 G/DL (ref 12–16)
IMM GRANULOCYTES # BLD AUTO: 0.02 X10*3/UL (ref 0–0.7)
IMM GRANULOCYTES NFR BLD AUTO: 0.3 % (ref 0–0.9)
LDLC SERPL CALC-MCNC: 94 MG/DL
LYMPHOCYTES # BLD AUTO: 1.9 X10*3/UL (ref 1.2–4.8)
LYMPHOCYTES NFR BLD AUTO: 31.4 %
MCH RBC QN AUTO: 24.3 PG (ref 26–34)
MCHC RBC AUTO-ENTMCNC: 30.5 G/DL (ref 32–36)
MCV RBC AUTO: 80 FL (ref 80–100)
MONOCYTES # BLD AUTO: 0.57 X10*3/UL (ref 0.1–1)
MONOCYTES NFR BLD AUTO: 9.4 %
NEUTROPHILS # BLD AUTO: 3.23 X10*3/UL (ref 1.2–7.7)
NEUTROPHILS NFR BLD AUTO: 53.4 %
NON HDL CHOLESTEROL: 127 MG/DL (ref 0–149)
NRBC BLD-RTO: 0 /100 WBCS (ref 0–0)
PLATELET # BLD AUTO: 163 X10*3/UL (ref 150–450)
POC HEMOGLOBIN A1C: 10.3 % (ref 4.2–6.5)
POTASSIUM SERPL-SCNC: 3.9 MMOL/L (ref 3.5–5.3)
PROT SERPL-MCNC: 7.3 G/DL (ref 6.4–8.2)
RBC # BLD AUTO: 5.52 X10*6/UL (ref 4–5.2)
SODIUM SERPL-SCNC: 138 MMOL/L (ref 136–145)
TRIGL SERPL-MCNC: 163 MG/DL (ref 0–149)
TSH SERPL-ACNC: 0.59 MIU/L (ref 0.44–3.98)
URATE SERPL-MCNC: 8.6 MG/DL (ref 2.3–6.7)
VLDL: 33 MG/DL (ref 0–40)
WBC # BLD AUTO: 6.1 X10*3/UL (ref 4.4–11.3)

## 2024-01-26 PROCEDURE — 3074F SYST BP LT 130 MM HG: CPT | Performed by: INTERNAL MEDICINE

## 2024-01-26 PROCEDURE — 3066F NEPHROPATHY DOC TX: CPT | Performed by: INTERNAL MEDICINE

## 2024-01-26 PROCEDURE — 1036F TOBACCO NON-USER: CPT | Performed by: INTERNAL MEDICINE

## 2024-01-26 PROCEDURE — 85025 COMPLETE CBC W/AUTO DIFF WBC: CPT

## 2024-01-26 PROCEDURE — 82570 ASSAY OF URINE CREATININE: CPT

## 2024-01-26 PROCEDURE — 82306 VITAMIN D 25 HYDROXY: CPT

## 2024-01-26 PROCEDURE — 84443 ASSAY THYROID STIM HORMONE: CPT

## 2024-01-26 PROCEDURE — 36415 COLL VENOUS BLD VENIPUNCTURE: CPT

## 2024-01-26 PROCEDURE — 80061 LIPID PANEL: CPT

## 2024-01-26 PROCEDURE — 82728 ASSAY OF FERRITIN: CPT

## 2024-01-26 PROCEDURE — 83550 IRON BINDING TEST: CPT

## 2024-01-26 PROCEDURE — 82043 UR ALBUMIN QUANTITATIVE: CPT

## 2024-01-26 PROCEDURE — 81001 URINALYSIS AUTO W/SCOPE: CPT

## 2024-01-26 PROCEDURE — 3078F DIAST BP <80 MM HG: CPT | Performed by: INTERNAL MEDICINE

## 2024-01-26 PROCEDURE — 84550 ASSAY OF BLOOD/URIC ACID: CPT

## 2024-01-26 PROCEDURE — 83540 ASSAY OF IRON: CPT

## 2024-01-26 PROCEDURE — 80053 COMPREHEN METABOLIC PANEL: CPT

## 2024-01-26 PROCEDURE — 99214 OFFICE O/P EST MOD 30 MIN: CPT | Performed by: INTERNAL MEDICINE

## 2024-01-26 PROCEDURE — 83036 HEMOGLOBIN GLYCOSYLATED A1C: CPT | Performed by: INTERNAL MEDICINE

## 2024-01-26 RX ORDER — ATORVASTATIN CALCIUM 80 MG/1
80 TABLET, FILM COATED ORAL DAILY
Qty: 90 TABLET | Refills: 3 | Status: SHIPPED | OUTPATIENT
Start: 2024-01-26

## 2024-01-26 RX ORDER — ALBUTEROL SULFATE 90 UG/1
1 AEROSOL, METERED RESPIRATORY (INHALATION) EVERY 6 HOURS PRN
Qty: 18 G | Refills: 3 | Status: SHIPPED | OUTPATIENT
Start: 2024-01-26

## 2024-01-26 RX ORDER — BLOOD SUGAR DIAGNOSTIC
STRIP MISCELLANEOUS
Qty: 100 STRIP | Refills: 11 | Status: SHIPPED | OUTPATIENT
Start: 2024-01-26

## 2024-01-26 RX ORDER — METOPROLOL TARTRATE 25 MG/1
25 TABLET, FILM COATED ORAL 2 TIMES DAILY
Qty: 180 TABLET | Refills: 3 | Status: SHIPPED | OUTPATIENT
Start: 2024-01-26

## 2024-01-26 RX ORDER — SPIRONOLACTONE 50 MG/1
50 TABLET, FILM COATED ORAL 2 TIMES DAILY
Qty: 180 TABLET | Refills: 3 | Status: SHIPPED | OUTPATIENT
Start: 2024-01-26

## 2024-01-26 RX ORDER — EMPAGLIFLOZIN 25 MG/1
25 TABLET, FILM COATED ORAL DAILY
Qty: 90 TABLET | Refills: 3 | Status: SHIPPED | OUTPATIENT
Start: 2024-01-26

## 2024-01-26 ASSESSMENT — ENCOUNTER SYMPTOMS
NAUSEA: 0
PALPITATIONS: 0
SEIZURES: 0
SORE THROAT: 0
NUMBNESS: 0
POLYPHAGIA: 0
VOMITING: 0
ADENOPATHY: 0
HEADACHES: 0
POLYDIPSIA: 0
BACK PAIN: 0
APPETITE CHANGE: 0
ABDOMINAL DISTENTION: 0
SHORTNESS OF BREATH: 0
NECK PAIN: 0
FACIAL ASYMMETRY: 0
DIZZINESS: 0
COLOR CHANGE: 0
NECK STIFFNESS: 0
ANAL BLEEDING: 0
BLOOD IN STOOL: 0
CONSTIPATION: 0
DIARRHEA: 0
WEAKNESS: 0
SLEEP DISTURBANCE: 0
DIAPHORESIS: 0
STRIDOR: 0
SINUS PRESSURE: 0
EYE PAIN: 0
RHINORRHEA: 0
FATIGUE: 0
SINUS PAIN: 0
PHOTOPHOBIA: 0
TROUBLE SWALLOWING: 0
HEMATURIA: 0
FLANK PAIN: 0
EYE DISCHARGE: 0
CHILLS: 0
COUGH: 0
FREQUENCY: 0
CHOKING: 0
EYE ITCHING: 0
RECTAL PAIN: 0
VOICE CHANGE: 0
WOUND: 0
SPEECH DIFFICULTY: 0
TREMORS: 0
BRUISES/BLEEDS EASILY: 0
JOINT SWELLING: 0
LIGHT-HEADEDNESS: 0
CHEST TIGHTNESS: 0
ARTHRALGIAS: 0
ABDOMINAL PAIN: 1
FACIAL SWELLING: 0
ACTIVITY CHANGE: 0
MYALGIAS: 0
DIFFICULTY URINATING: 0
DYSURIA: 0
WHEEZING: 0
EYE REDNESS: 0

## 2024-01-26 ASSESSMENT — PAIN SCALES - GENERAL: PAINLEVEL: 4

## 2024-01-26 NOTE — PATIENT INSTRUCTIONS
Please schedule your mammogram.  Obtain blood work and urine.  Schedule your CAT scan.  Discussed your elevated hemoglobin A1c with endocrinology today.

## 2024-01-26 NOTE — PROGRESS NOTES
Subjective   Patient ID: Anna Santos is a 63 y.o. female who presents for Chest Pain (Rib pain for about 1 month).    Patient presents for follow-up.  She has been compliant with her medications and diet but not exercise.  She is been complaining of intermittent left upper quadrant abdominal pain over the past month.  Symptoms are intermittent.  She reports sharp pain every few minutes.  Is not associated with food or movement.  There is no nausea vomiting or diarrhea.  No constipation.  She otherwise feels okay.  She denies any headaches, no dizziness, no chest pain or shortness of breath.  She denies abdominal pain no nausea vomiting or diarrhea.  She reports no new musculoskeletal complaints.    Chest Pain   Associated symptoms include abdominal pain. Pertinent negatives include no back pain, cough, diaphoresis, dizziness, headaches, nausea, numbness, palpitations, shortness of breath, vomiting or weakness.   Pertinent negatives for past medical history include no seizures.        Review of Systems   Constitutional:  Negative for activity change, appetite change, chills, diaphoresis and fatigue.   HENT:  Negative for congestion, dental problem, drooling, ear discharge, ear pain, facial swelling, hearing loss, mouth sores, nosebleeds, postnasal drip, rhinorrhea, sinus pressure, sinus pain, sneezing, sore throat, tinnitus, trouble swallowing and voice change.    Eyes:  Negative for photophobia, pain, discharge, redness, itching and visual disturbance.   Respiratory:  Negative for cough, choking, chest tightness, shortness of breath, wheezing and stridor.    Cardiovascular:  Negative for chest pain, palpitations and leg swelling.   Gastrointestinal:  Positive for abdominal pain. Negative for abdominal distention, anal bleeding, blood in stool, constipation, diarrhea, nausea, rectal pain and vomiting.   Endocrine: Negative for cold intolerance, heat intolerance, polydipsia, polyphagia and polyuria.    Genitourinary:  Negative for decreased urine volume, difficulty urinating, dysuria, enuresis, flank pain, frequency, genital sores, hematuria and urgency.   Musculoskeletal:  Negative for arthralgias, back pain, gait problem, joint swelling, myalgias, neck pain and neck stiffness.   Skin:  Negative for color change, pallor, rash and wound.   Neurological:  Negative for dizziness, tremors, seizures, syncope, facial asymmetry, speech difficulty, weakness, light-headedness, numbness and headaches.   Hematological:  Negative for adenopathy. Does not bruise/bleed easily.   Psychiatric/Behavioral:  Negative for sleep disturbance.        Objective   Wt 105 kg (230 lb 8 oz)   BMI 32.15 kg/m²     Physical Exam  Constitutional:       Appearance: Normal appearance.   Cardiovascular:      Rate and Rhythm: Normal rate and regular rhythm.      Heart sounds: No murmur heard.     No gallop.   Pulmonary:      Effort: No respiratory distress.      Breath sounds: No wheezing or rales.   Abdominal:      General: There is no distension.      Palpations: There is no mass.      Tenderness: There is abdominal tenderness (Minimal tenderness in the left upper quadrant.  No rebound or guarding). There is no guarding.   Musculoskeletal:      Right lower leg: No edema.      Left lower leg: No edema.   Neurological:      Mental Status: She is alert.       Assessment/Plan   Diagnoses and all orders for this visit:  Screening mammogram for breast cancer  -     BI mammo bilateral screening tomosynthesis; Future  Health care maintenance-she will schedule mammogram.  Colonoscopy is up-to-date.  Refuses immunizations  -     CBC and Auto Differential; Future  -     Vitamin D 25-Hydroxy,Total (for eval of Vitamin D levels); Future  -     TSH with reflex to Free T4 if abnormal; Future  -     Uric Acid; Future  -     Urinalysis with Reflex Microscopic; Future  -     Albumin , Urine Random; Future  -     Comprehensive Metabolic Panel; Future  -      Lipid Panel; Future  Hyperparathyroidism, unspecified (CMS/HCC)  Type 2 diabetes mellitus with stage 3b chronic kidney disease, with long-term current use of insulin (CMS/HCC)-she will call her endocrinologist today for adjustment in medication.  Type 1 diabetes mellitus with diabetic cataract (CMS/HCC)  Left upper quadrant abdominal pain will check a CAT scan.  Etiology unclear  -     CT abdomen wo IV contrast; Future  Agatston coronary artery calcium score between 200 and 399-we will modify risk factors  Benign essential hypertension-stable on present medication  Mixed hyperlipidemia  Insulin pump in place  Stage 3 chronic kidney disease, unspecified whether stage 3a or 3b CKD (CMS/HCC)-recheck creatinine  Anemia, unspecified type-recheck CBC

## 2024-01-27 DIAGNOSIS — D64.9 ANEMIA, UNSPECIFIED TYPE: Primary | ICD-10-CM

## 2024-01-27 LAB
APPEARANCE UR: ABNORMAL
BILIRUB UR STRIP.AUTO-MCNC: NEGATIVE MG/DL
COLOR UR: ABNORMAL
CREAT UR-MCNC: 269.4 MG/DL (ref 20–320)
FERRITIN SERPL-MCNC: 246 NG/ML (ref 8–150)
GLUCOSE UR STRIP.AUTO-MCNC: NEGATIVE MG/DL
IRON SATN MFR SERPL: 28 % (ref 25–45)
IRON SERPL-MCNC: 94 UG/DL (ref 35–150)
KETONES UR STRIP.AUTO-MCNC: NEGATIVE MG/DL
LEUKOCYTE ESTERASE UR QL STRIP.AUTO: NEGATIVE
MICROALBUMIN UR-MCNC: 36 MG/L
MICROALBUMIN/CREAT UR: 13.4 UG/MG CREAT
NITRITE UR QL STRIP.AUTO: NEGATIVE
PH UR STRIP.AUTO: 5.5 [PH]
PROT UR STRIP.AUTO-MCNC: ABNORMAL MG/DL
RBC # UR STRIP.AUTO: NEGATIVE /UL
RBC #/AREA URNS AUTO: NORMAL /HPF
SP GR UR STRIP.AUTO: 1.02
SQUAMOUS #/AREA URNS AUTO: NORMAL /HPF
TIBC SERPL-MCNC: 331 UG/DL (ref 240–445)
UIBC SERPL-MCNC: 237 UG/DL (ref 110–370)
UROBILINOGEN UR STRIP.AUTO-MCNC: 0.2 MG/DL
WBC #/AREA URNS AUTO: NORMAL /HPF

## 2024-01-31 DIAGNOSIS — I10 ACCELERATED HYPERTENSION: ICD-10-CM

## 2024-01-31 RX ORDER — HYDROCHLOROTHIAZIDE 50 MG/1
50 TABLET ORAL DAILY
Qty: 90 TABLET | Refills: 1 | Status: SHIPPED | OUTPATIENT
Start: 2024-01-31 | End: 2024-01-31

## 2024-01-31 RX ORDER — HYDROCHLOROTHIAZIDE 50 MG/1
50 TABLET ORAL DAILY
Qty: 90 TABLET | Refills: 1 | Status: SHIPPED | OUTPATIENT
Start: 2024-01-31

## 2024-02-06 ENCOUNTER — APPOINTMENT (OUTPATIENT)
Dept: RADIOLOGY | Facility: CLINIC | Age: 64
End: 2024-02-06
Payer: COMMERCIAL

## 2024-02-13 ENCOUNTER — HOSPITAL ENCOUNTER (OUTPATIENT)
Dept: RADIOLOGY | Facility: CLINIC | Age: 64
Discharge: HOME | End: 2024-02-13
Payer: COMMERCIAL

## 2024-02-13 DIAGNOSIS — R10.12 LEFT UPPER QUADRANT ABDOMINAL PAIN: ICD-10-CM

## 2024-02-13 DIAGNOSIS — N28.1 RENAL CYST: Primary | ICD-10-CM

## 2024-02-13 DIAGNOSIS — R19.00 MASS OF PELVIS: ICD-10-CM

## 2024-02-13 PROCEDURE — 2500000001 HC RX 250 WO HCPCS SELF ADMINISTERED DRUGS (ALT 637 FOR MEDICARE OP): Performed by: INTERNAL MEDICINE

## 2024-02-13 PROCEDURE — 74150 CT ABDOMEN W/O CONTRAST: CPT

## 2024-02-13 PROCEDURE — 74176 CT ABD & PELVIS W/O CONTRAST: CPT

## 2024-02-13 PROCEDURE — 74176 CT ABD & PELVIS W/O CONTRAST: CPT | Performed by: RADIOLOGY

## 2024-02-13 RX ADMIN — BARIUM SULFATE 450 ML: 20 SUSPENSION ORAL at 17:23

## 2024-02-28 ENCOUNTER — HOSPITAL ENCOUNTER (OUTPATIENT)
Dept: RADIOLOGY | Facility: CLINIC | Age: 64
Discharge: HOME | End: 2024-02-28
Payer: COMMERCIAL

## 2024-02-28 DIAGNOSIS — N28.1 RENAL CYST: ICD-10-CM

## 2024-02-28 DIAGNOSIS — R19.00 MASS OF PELVIS: ICD-10-CM

## 2024-02-28 DIAGNOSIS — N94.9 ADNEXAL CYST: Primary | ICD-10-CM

## 2024-02-28 PROCEDURE — 74181 MRI ABDOMEN W/O CONTRAST: CPT

## 2024-02-28 PROCEDURE — 72195 MRI PELVIS W/O DYE: CPT | Performed by: STUDENT IN AN ORGANIZED HEALTH CARE EDUCATION/TRAINING PROGRAM

## 2024-02-28 PROCEDURE — 72195 MRI PELVIS W/O DYE: CPT

## 2024-02-28 PROCEDURE — 74181 MRI ABDOMEN W/O CONTRAST: CPT | Performed by: STUDENT IN AN ORGANIZED HEALTH CARE EDUCATION/TRAINING PROGRAM

## 2024-03-06 ENCOUNTER — HOSPITAL ENCOUNTER (OUTPATIENT)
Dept: RADIOLOGY | Facility: CLINIC | Age: 64
Discharge: HOME | End: 2024-03-06
Payer: COMMERCIAL

## 2024-03-06 DIAGNOSIS — N94.9 ADNEXAL CYST: ICD-10-CM

## 2024-03-06 PROCEDURE — 76857 US EXAM PELVIC LIMITED: CPT | Performed by: STUDENT IN AN ORGANIZED HEALTH CARE EDUCATION/TRAINING PROGRAM

## 2024-03-06 PROCEDURE — 76856 US EXAM PELVIC COMPLETE: CPT

## 2024-03-06 PROCEDURE — 76830 TRANSVAGINAL US NON-OB: CPT | Performed by: STUDENT IN AN ORGANIZED HEALTH CARE EDUCATION/TRAINING PROGRAM

## 2024-03-19 ENCOUNTER — TELEPHONE (OUTPATIENT)
Dept: PRIMARY CARE | Facility: CLINIC | Age: 64
End: 2024-03-19

## 2024-03-19 ENCOUNTER — OFFICE VISIT (OUTPATIENT)
Dept: OBSTETRICS AND GYNECOLOGY | Facility: CLINIC | Age: 64
End: 2024-03-19
Payer: COMMERCIAL

## 2024-03-19 VITALS — BODY MASS INDEX: 32.78 KG/M2 | DIASTOLIC BLOOD PRESSURE: 73 MMHG | SYSTOLIC BLOOD PRESSURE: 129 MMHG | WEIGHT: 235 LBS

## 2024-03-19 DIAGNOSIS — N28.1 KIDNEY CYST, ACQUIRED: Primary | ICD-10-CM

## 2024-03-19 DIAGNOSIS — N94.89 ADNEXAL MASS: ICD-10-CM

## 2024-03-19 DIAGNOSIS — R93.5 ABNORMAL MRI, PELVIS: Primary | ICD-10-CM

## 2024-03-19 PROCEDURE — 3060F POS MICROALBUMINURIA REV: CPT | Performed by: OBSTETRICS & GYNECOLOGY

## 2024-03-19 PROCEDURE — 3074F SYST BP LT 130 MM HG: CPT | Performed by: OBSTETRICS & GYNECOLOGY

## 2024-03-19 PROCEDURE — 3048F LDL-C <100 MG/DL: CPT | Performed by: OBSTETRICS & GYNECOLOGY

## 2024-03-19 PROCEDURE — 3078F DIAST BP <80 MM HG: CPT | Performed by: OBSTETRICS & GYNECOLOGY

## 2024-03-19 PROCEDURE — 99214 OFFICE O/P EST MOD 30 MIN: CPT | Performed by: OBSTETRICS & GYNECOLOGY

## 2024-03-19 PROCEDURE — 1036F TOBACCO NON-USER: CPT | Performed by: OBSTETRICS & GYNECOLOGY

## 2024-03-19 ASSESSMENT — ENCOUNTER SYMPTOMS
DYSURIA: 0
LEG PAIN: 1
BOWEL INCONTINENCE: 0
FEVER: 0
WEIGHT LOSS: 0
WEAKNESS: 1
ABDOMINAL PAIN: 1
PARESTHESIAS: 1
PARESIS: 0
BACK PAIN: 1
PERIANAL NUMBNESS: 0
TINGLING: 0
NUMBNESS: 1
HEADACHES: 0

## 2024-03-19 NOTE — PROGRESS NOTES
Anna Santos is a 63 y.o. female who presents with a chief complaint of cyst on kidney  SUBJECTIVE  She was referred by her PCP after a CT scan February 13, 2024 showed a left adnexal mass of 4.3 cm, it was measured at 6.7 cm in the past.  An MRI in February 28, 2024 showed this area is a lobulated mass of 4.2 cm.  It has been present since 2013.  The most recent scan was ultrasound March 6, 2024 that noted a hysterectomy, the left adnexal area had a cystic lesion with septations.  There are no concerns on the right.  She denies any pain.  No dysuria, no change in bowel habits, she does occasion have constipation.  She is current on her colonoscopy.    Past Medical History:   Diagnosis Date    Acute candidiasis of vulva and vagina 11/21/2013    Vaginal candidiasis    Acute upper respiratory infection, unspecified 09/24/2015    Viral URI with cough    Encounter for gynecological examination (general) (routine) without abnormal findings 08/22/2016    Encounter for gynecological examination    Encounter for gynecological examination (general) (routine) without abnormal findings 10/21/2014    Encounter for routine gynecological examination    Encounter for immunization 11/19/2020    Need for prophylactic vaccination and inoculation against influenza    Encounter for screening mammogram for malignant neoplasm of breast 08/22/2016    Visit for screening mammogram    Personal history of other diseases of the respiratory system 03/11/2014    History of sinusitis     Past Surgical History:   Procedure Laterality Date    COLONOSCOPY  11/28/2014    Complete Colonoscopy    COLONOSCOPY  10/2019    rpt 10-29    ESOPHAGOGASTRODUODENOSCOPY  11/21/2013    Diagnostic Esophagogastroduodenoscopy    HYSTERECTOMY  09/11/2013    Hysterectomy     Social History     Socioeconomic History    Marital status:      Spouse name: None    Number of children: None    Years of education: None    Highest education level: None   Occupational  History    None   Tobacco Use    Smoking status: Never    Smokeless tobacco: Never   Substance and Sexual Activity    Alcohol use: Never    Drug use: Never    Sexual activity: None   Other Topics Concern    None   Social History Narrative    None     Social Determinants of Health     Financial Resource Strain: Not on file   Food Insecurity: Not on file   Transportation Needs: Not on file   Physical Activity: Not on file   Stress: Not on file   Social Connections: Not on file   Intimate Partner Violence: Not on file   Housing Stability: Not on file     Family History   Problem Relation Name Age of Onset    Dementia Mother      Diabetes Mother      Coronary artery disease Mother      Lung cancer Father      Coronary artery disease Father      Diabetes Father         OB History   No obstetric history on file.       OBJECTIVE  Allergies   Allergen Reactions    Ace Inhibitors Angioedema, Cough, Swelling and Other     Cough    cough    Iodinated Contrast Media Nausea Only      (Not in a hospital admission)       Review of Systems  Negative except patient denies pain or concerns.  Physical Exam  General Appearance: well developed, well nourished    /73   Wt 107 kg (235 lb)   LMP  (LMP Unknown)   BMI 32.78 kg/m²    Problem List Items Addressed This Visit    None  Visit Diagnoses       Abnormal MRI, pelvis    -  Primary    Relevant Orders    MR pelvis w and wo IV contrast        This is a 63-year-old female that has a long history of left adnexal lesion.  There is no acute pelvic pain, other discomforts appear to be musculoskeletal..  The most recent MRI on February 28, 2024 noted a 4.2 cm lobulated mass, they did recommend a repeat MRI in about 3 to 6 months with contrast.  This was ordered.  She will call me with any concerns, she will follow-up routinely.      Answers submitted by the patient for this visit:  Back Pain Questionnaire (Submitted on 3/19/2024)  Chief Complaint: Back pain  Chronicity:  recurrent  Onset: more than 1 month ago  Frequency: every several days  Progression since onset: waxing and waning  Pain location: sacro-iliac  Pain quality: aching  Radiates to: does not radiate  Pain - numeric: 6/10  Pain is: worse during the day  Aggravated by: standing  Stiffness is present: all day  abdominal pain: Yes  bladder incontinence: No  bowel incontinence: No  chest pain: No  dysuria: No  fever: No  headaches: No  leg pain: Yes  numbness: Yes  paresis: No  paresthesias: Yes  pelvic pain: No  perianal numbness: No  tingling: No  weakness: Yes  weight loss: No  Risk factors: lack of exercise, obesity

## 2024-06-03 ENCOUNTER — OFFICE VISIT (OUTPATIENT)
Dept: UROLOGY | Facility: HOSPITAL | Age: 64
End: 2024-06-03
Payer: COMMERCIAL

## 2024-06-03 DIAGNOSIS — N28.1 KIDNEY CYST, ACQUIRED: Primary | ICD-10-CM

## 2024-06-03 PROCEDURE — 3060F POS MICROALBUMINURIA REV: CPT | Performed by: STUDENT IN AN ORGANIZED HEALTH CARE EDUCATION/TRAINING PROGRAM

## 2024-06-03 PROCEDURE — 3048F LDL-C <100 MG/DL: CPT | Performed by: STUDENT IN AN ORGANIZED HEALTH CARE EDUCATION/TRAINING PROGRAM

## 2024-06-03 PROCEDURE — 99203 OFFICE O/P NEW LOW 30 MIN: CPT | Performed by: STUDENT IN AN ORGANIZED HEALTH CARE EDUCATION/TRAINING PROGRAM

## 2024-06-03 PROCEDURE — 99213 OFFICE O/P EST LOW 20 MIN: CPT | Performed by: STUDENT IN AN ORGANIZED HEALTH CARE EDUCATION/TRAINING PROGRAM

## 2024-06-03 NOTE — PROGRESS NOTES
UROLOGIC FOLLOW-UP VISIT     PROBLEM LIST:  1. Kidney cyst, acquired  Referral to Urology           HISTORY OF PRESENT ILLNESS:   Anna Santos is a 63 y.o. female who presents to day as a referral from Dr. Hodgson for renal cyst evaluation. She underwent MR of a/p on 2/28/2024 that showed Bilateral renal cyst-like lesions, most notably a 10 cm lesion in the left lower pole with a focus of probable proteinaceous/hemorrhagic component (suspect Bosniak 2 cyst). She does have a family history of lung cancer. She does have complaints of constant abdominal pain that has been on going for about 8 months. She does utilize Miralax for constipation due to her Ozempic medication. She desnies any  lower urinary tract symptoms, Hematuria or dysuria.     PAST MEDICAL HISTORY:  Past Medical History:   Diagnosis Date    Acute candidiasis of vulva and vagina 11/21/2013    Vaginal candidiasis    Acute upper respiratory infection, unspecified 09/24/2015    Viral URI with cough    Encounter for gynecological examination (general) (routine) without abnormal findings 08/22/2016    Encounter for gynecological examination    Encounter for gynecological examination (general) (routine) without abnormal findings 10/21/2014    Encounter for routine gynecological examination    Encounter for immunization 11/19/2020    Need for prophylactic vaccination and inoculation against influenza    Encounter for screening mammogram for malignant neoplasm of breast 08/22/2016    Visit for screening mammogram    Personal history of other diseases of the respiratory system 03/11/2014    History of sinusitis       PAST SURGICAL HISTORY:  Past Surgical History:   Procedure Laterality Date    COLONOSCOPY  11/28/2014    Complete Colonoscopy    COLONOSCOPY  10/2019    rpt 10-29    ESOPHAGOGASTRODUODENOSCOPY  11/21/2013    Diagnostic Esophagogastroduodenoscopy    HYSTERECTOMY  09/11/2013    Hysterectomy        ALLERGIES:   Allergies   Allergen Reactions    Ace  "Inhibitors Angioedema, Cough, Swelling and Other     Cough    cough    Iodinated Contrast Media Nausea Only        MEDICATIONS:     Current Outpatient Medications:     albuterol 90 mcg/actuation inhaler, Inhale 1 puff every 6 hours if needed for wheezing., Disp: 18 g, Rfl: 3    aspirin 81 mg EC tablet, Take 1 tablet (81 mg) by mouth once daily., Disp: , Rfl:     atorvastatin (Lipitor) 80 mg tablet, Take 1 tablet (80 mg) by mouth once daily., Disp: 90 tablet, Rfl: 3    blood sugar diagnostic (Contour Next Test Strips) strip, CHECK BLOOD SUAR 4-5 TIMES PER DAY FOR INSULIN REQUIRING T2DM, Disp: 100 strip, Rfl: 11    budesonide-formoteroL (Symbicort) 160-4.5 mcg/actuation inhaler, Inhale 2 puffs 2 times a day., Disp: , Rfl:     cholecalciferol (Vitamin D-3) 5,000 Units tablet, Take by mouth., Disp: , Rfl:     Dexcom G4 platinum  (Dexcom G6 ) misc, USE AS DIRECTED, Disp: 3 each, Rfl: 11    Dexcom G6 Sensor device, APPLY TO UPPER ARM AND CHANGE EVERY 10 DAYS, Disp: , Rfl:     Dexcom G6 Transmitter device, USE AS DIRECTED, Disp: , Rfl:     fluticasone (Flonase) 50 mcg/actuation nasal spray, Administer 1 spray into affected nostril(s) 2 times a day., Disp: , Rfl:     HumuLIN R U-500, Conc, Insulin 500 unit/mL CONCENTRATED injection, Humulin R U-500 (Concentrated) Insulin 500 unit/mL subcutaneous soln, Disp: , Rfl:     hydroCHLOROthiazide (HYDRODiuril) 50 mg tablet, Take 1 tablet by mouth once daily., Disp: 90 tablet, Rfl: 1    hyoscyamine 0.125 mg disintegrating tablet, Place 1 tablet (0.125 mg) under the tongue 3 times a day., Disp: , Rfl:     insulin syringe-needle U-100 (BD Insulin Syringe Ultra-Fine) 1 mL 30 gauge x 1/2\" syringe, , Disp: , Rfl:     Jardiance 25 mg, Take 1 tablet (25 mg) by mouth once daily., Disp: 90 tablet, Rfl: 3    magnesium oxide (Mag-Ox) 400 mg (241.3 mg magnesium) tablet, Take 1 tablet (400 mg) by mouth once daily., Disp: , Rfl:     metoprolol tartrate (Lopressor) 25 mg tablet, " TAKE 1 TABLET TWICE A DAY, Disp: 180 tablet, Rfl: 1    metoprolol tartrate (Lopressor) 25 mg tablet, Take 1 tablet (25 mg) by mouth 2 times a day., Disp: 180 tablet, Rfl: 3    mupirocin (Bactroban) 2 % ointment, Apply topically 3 times a day., Disp: , Rfl:     ONETOUCH DELICA LANCETS MISC, 4 times a day., Disp: , Rfl:     pantoprazole (ProtoNix) 40 mg EC tablet, Take 1 tablet (40 mg) by mouth once daily in the morning. Take before meals., Disp: , Rfl:     polyethylene glycol (Miralax) 17 gram/dose powder, Take 1 Dose by mouth once daily at bedtime., Disp: , Rfl:     semaglutide (Ozempic) 0.25 mg or 0.5 mg(2 mg/1.5 mL) pen injector, Inject 0.25 mg under the skin., Disp: , Rfl:     semaglutide (Ozempic) 1 mg/dose (4 mg/3 mL) pen injector, Inject 1 mg under the skin 1 (one) time per week., Disp: 9 mL, Rfl: 2    spironolactone (Aldactone) 50 mg tablet, Take 1 tablet (50 mg) by mouth 2 times a day., Disp: 180 tablet, Rfl: 3    subcutaneous insulin pump (MiniMed 530G Insulin Pump) misc, , Disp: , Rfl:     traMADol (Ultram) 50 mg tablet, Take 1 tablet (50 mg) by mouth every 4 hours if needed., Disp: , Rfl:       SOCIAL HISTORY:  Patient  reports that she has never smoked. She has never used smokeless tobacco. She reports that she does not drink alcohol and does not use drugs.   Social History     Socioeconomic History    Marital status:      Spouse name: Not on file    Number of children: Not on file    Years of education: Not on file    Highest education level: Not on file   Occupational History    Not on file   Tobacco Use    Smoking status: Never    Smokeless tobacco: Never   Substance and Sexual Activity    Alcohol use: Never    Drug use: Never    Sexual activity: Not on file   Other Topics Concern    Not on file   Social History Narrative    Not on file     Social Determinants of Health     Financial Resource Strain: Not on file   Food Insecurity: Not on file   Transportation Needs: Not on file   Physical  Activity: Not on file   Stress: Not on file   Social Connections: Not on file   Intimate Partner Violence: Not on file   Housing Stability: Not on file       FAMILY HISTORY:  Family History   Problem Relation Name Age of Onset    Dementia Mother      Diabetes Mother      Coronary artery disease Mother      Lung cancer Father      Coronary artery disease Father      Diabetes Father         REVIEW OF SYSTEMS:  Constitutional: Negative for fever and chills. Denies anorexia, weight loss.  Eyes: Negative for visual disturbance.   Respiratory: Negative for shortness of breath.    Cardiovascular: Negative for chest pain.   Gastrointestinal: Negative for nausea and vomiting.   Genitourinary: See interval history above.  Skin: Negative for rash.   Neurological: Negative for dizziness and numbness.   Psychiatric/Behavioral: Negative for confusion and decreased concentration.     PHYSICAL EXAM:  There were no vitals taken for this visit.  Constitutional: Patient appears well-developed and well-nourished. No distress.    Head: Normocephalic and atraumatic.    Neck: Normal range of motion.    Cardiovascular: Normal rate.    Pulmonary/Chest: Effort normal. No respiratory distress.   Abdominal: soft NTND  : No palpable flank masses, no CVA ttp  Musculoskeletal: Normal range of motion.    Neurological: Alert and oriented to person, place, and time.  Psychiatric: Normal mood and affect. Behavior is normal. Thought content normal.      LABORATORY REVIEW:     Lab Results   Component Value Date    BUN 26 (H) 01/26/2024    CREATININE 1.55 (H) 01/26/2024    EGFR 37 (L) 01/26/2024     01/26/2024    K 3.9 01/26/2024     01/26/2024    CO2 23 01/26/2024    CALCIUM 10.1 01/26/2024      Lab Results   Component Value Date    WBC 6.1 01/26/2024    RBC 5.52 (H) 01/26/2024    HGB 13.4 01/26/2024    HCT 43.9 01/26/2024    MCV 80 01/26/2024    MCH 24.3 (L) 01/26/2024    MCHC 30.5 (L) 01/26/2024    RDW 14.3 01/26/2024      01/26/2024        Assessment:      1. Kidney cyst, acquired  Referral to Urology         Plan:    Reviewed and interpreted patient's MRI scan    Reviewed with patient how we use the bosniak scoring system for renal cyst    Reviewed patient's imaging with her   Will get renal MRI wo and w contrast to further evaluation    RTC after MRI    31 minutes total spent on patient's care today; >50% time spent on counseling/coordination of care

## 2024-06-14 ENCOUNTER — HOSPITAL ENCOUNTER (OUTPATIENT)
Dept: RADIOLOGY | Facility: CLINIC | Age: 64
Discharge: HOME | End: 2024-06-14
Payer: COMMERCIAL

## 2024-06-14 DIAGNOSIS — N28.1 KIDNEY CYST, ACQUIRED: ICD-10-CM

## 2024-06-14 PROCEDURE — A9575 INJ GADOTERATE MEGLUMI 0.1ML: HCPCS | Performed by: STUDENT IN AN ORGANIZED HEALTH CARE EDUCATION/TRAINING PROGRAM

## 2024-06-14 PROCEDURE — 74183 MRI ABD W/O CNTR FLWD CNTR: CPT

## 2024-06-14 PROCEDURE — 2550000001 HC RX 255 CONTRASTS: Performed by: STUDENT IN AN ORGANIZED HEALTH CARE EDUCATION/TRAINING PROGRAM

## 2024-06-14 RX ORDER — GADOTERATE MEGLUMINE 376.9 MG/ML
20 INJECTION INTRAVENOUS
Status: COMPLETED | OUTPATIENT
Start: 2024-06-14 | End: 2024-06-14

## 2024-06-26 PROBLEM — N28.1 KIDNEY CYST, ACQUIRED: Status: ACTIVE | Noted: 2024-06-26

## 2024-06-26 NOTE — PROGRESS NOTES
UROLOGIC FOLLOW-UP VISIT     PROBLEM LIST:  1. Kidney cyst, acquired             HISTORY OF PRESENT ILLNESS:   Anna Santos is a 63 y.o. female who presents to day as a referral from Dr. Hodgson for renal cyst evaluation. She underwent MR of a/p on 2/28/2024 that showed Bilateral renal cyst-like lesions, most notably a 10 cm lesion in the left lower pole with a focus of probable proteinaceous/hemorrhagic component (suspect Bosniak 2 cyst). She does have a family history of lung cancer. She does have complaints of constant abdominal pain that has been on going for about 8 months. She does utilize Miralax for constipation due to her Ozempic medication. She desnies any  lower urinary tract symptoms, Hematuria or dysuria.  She underwent renal MR on 6/14/24 and presents today to discuss results.     PAST MEDICAL HISTORY:  Past Medical History:   Diagnosis Date    Acute candidiasis of vulva and vagina 11/21/2013    Vaginal candidiasis    Acute upper respiratory infection, unspecified 09/24/2015    Viral URI with cough    Encounter for gynecological examination (general) (routine) without abnormal findings 08/22/2016    Encounter for gynecological examination    Encounter for gynecological examination (general) (routine) without abnormal findings 10/21/2014    Encounter for routine gynecological examination    Encounter for immunization 11/19/2020    Need for prophylactic vaccination and inoculation against influenza    Encounter for screening mammogram for malignant neoplasm of breast 08/22/2016    Visit for screening mammogram    Personal history of other diseases of the respiratory system 03/11/2014    History of sinusitis       PAST SURGICAL HISTORY:  Past Surgical History:   Procedure Laterality Date    COLONOSCOPY  11/28/2014    Complete Colonoscopy    COLONOSCOPY  10/2019    rpt 10-29    ESOPHAGOGASTRODUODENOSCOPY  11/21/2013    Diagnostic Esophagogastroduodenoscopy    HYSTERECTOMY  09/11/2013    Hysterectomy  "       ALLERGIES:   Allergies   Allergen Reactions    Ace Inhibitors Angioedema, Cough, Swelling and Other     Cough    cough    Iodinated Contrast Media Nausea Only        MEDICATIONS:     Current Outpatient Medications:     albuterol 90 mcg/actuation inhaler, Inhale 1 puff every 6 hours if needed for wheezing., Disp: 18 g, Rfl: 3    aspirin 81 mg EC tablet, Take 1 tablet (81 mg) by mouth once daily., Disp: , Rfl:     atorvastatin (Lipitor) 80 mg tablet, Take 1 tablet (80 mg) by mouth once daily., Disp: 90 tablet, Rfl: 3    blood sugar diagnostic (Contour Next Test Strips) strip, CHECK BLOOD SUAR 4-5 TIMES PER DAY FOR INSULIN REQUIRING T2DM, Disp: 100 strip, Rfl: 11    budesonide-formoteroL (Symbicort) 160-4.5 mcg/actuation inhaler, Inhale 2 puffs 2 times a day., Disp: , Rfl:     cholecalciferol (Vitamin D-3) 5,000 Units tablet, Take by mouth., Disp: , Rfl:     Dexcom G4 platinum  (Dexcom G6 ) misc, USE AS DIRECTED, Disp: 3 each, Rfl: 11    Dexcom G6 Sensor device, APPLY TO UPPER ARM AND CHANGE EVERY 10 DAYS, Disp: , Rfl:     Dexcom G6 Transmitter device, USE AS DIRECTED, Disp: , Rfl:     fluticasone (Flonase) 50 mcg/actuation nasal spray, Administer 1 spray into affected nostril(s) 2 times a day., Disp: , Rfl:     HumuLIN R U-500, Conc, Insulin 500 unit/mL CONCENTRATED injection, Humulin R U-500 (Concentrated) Insulin 500 unit/mL subcutaneous soln, Disp: , Rfl:     hydroCHLOROthiazide (HYDRODiuril) 50 mg tablet, Take 1 tablet by mouth once daily., Disp: 90 tablet, Rfl: 1    hyoscyamine 0.125 mg disintegrating tablet, Place 1 tablet (0.125 mg) under the tongue 3 times a day., Disp: , Rfl:     insulin syringe-needle U-100 (BD Insulin Syringe Ultra-Fine) 1 mL 30 gauge x 1/2\" syringe, , Disp: , Rfl:     Jardiance 25 mg, Take 1 tablet (25 mg) by mouth once daily., Disp: 90 tablet, Rfl: 3    magnesium oxide (Mag-Ox) 400 mg (241.3 mg magnesium) tablet, Take 1 tablet (400 mg) by mouth once daily., Disp: , " Rfl:     metoprolol tartrate (Lopressor) 25 mg tablet, TAKE 1 TABLET TWICE A DAY, Disp: 180 tablet, Rfl: 1    metoprolol tartrate (Lopressor) 25 mg tablet, Take 1 tablet (25 mg) by mouth 2 times a day., Disp: 180 tablet, Rfl: 3    mupirocin (Bactroban) 2 % ointment, Apply topically 3 times a day., Disp: , Rfl:     ONETOUCH DELICA LANCETS MISC, 4 times a day., Disp: , Rfl:     pantoprazole (ProtoNix) 40 mg EC tablet, Take 1 tablet (40 mg) by mouth once daily in the morning. Take before meals., Disp: , Rfl:     polyethylene glycol (Miralax) 17 gram/dose powder, Take 1 Dose by mouth once daily at bedtime., Disp: , Rfl:     semaglutide (Ozempic) 0.25 mg or 0.5 mg(2 mg/1.5 mL) pen injector, Inject 0.25 mg under the skin., Disp: , Rfl:     semaglutide (Ozempic) 1 mg/dose (4 mg/3 mL) pen injector, Inject 1 mg under the skin 1 (one) time per week., Disp: 9 mL, Rfl: 2    spironolactone (Aldactone) 50 mg tablet, Take 1 tablet (50 mg) by mouth 2 times a day., Disp: 180 tablet, Rfl: 3    subcutaneous insulin pump (MiniMed 530G Insulin Pump) misc, , Disp: , Rfl:     traMADol (Ultram) 50 mg tablet, Take 1 tablet (50 mg) by mouth every 4 hours if needed., Disp: , Rfl:       SOCIAL HISTORY:  Patient  reports that she has never smoked. She has never used smokeless tobacco. She reports that she does not drink alcohol and does not use drugs.   Social History     Socioeconomic History    Marital status:      Spouse name: Not on file    Number of children: Not on file    Years of education: Not on file    Highest education level: Not on file   Occupational History    Not on file   Tobacco Use    Smoking status: Never    Smokeless tobacco: Never   Substance and Sexual Activity    Alcohol use: Never    Drug use: Never    Sexual activity: Not on file   Other Topics Concern    Not on file   Social History Narrative    Not on file     Social Determinants of Health     Financial Resource Strain: Not on file   Food Insecurity: Not on  file   Transportation Needs: Not on file   Physical Activity: Not on file   Stress: Not on file   Social Connections: Not on file   Intimate Partner Violence: Not on file   Housing Stability: Not on file       FAMILY HISTORY:  Family History   Problem Relation Name Age of Onset    Dementia Mother      Diabetes Mother      Coronary artery disease Mother      Lung cancer Father      Coronary artery disease Father      Diabetes Father         REVIEW OF SYSTEMS:   Constitutional: Negative for fever and chills. Denies anorexia, weight loss.  Eyes: Negative for visual disturbance.   Respiratory: Negative for shortness of breath.    Cardiovascular: Negative for chest pain.   Gastrointestinal: Negative for nausea and vomiting.   Genitourinary: See interval history above.  Skin: Negative for rash.   Neurological: Negative for dizziness and numbness.   Psychiatric/Behavioral: Negative for confusion and decreased concentration.     PHYSICAL EXAM:  There were no vitals taken for this visit.  Constitutional: Patient appears well-developed and well-nourished. No distress.    Head: Normocephalic and atraumatic.    Neck: Normal range of motion.    Cardiovascular: Normal rate.    Pulmonary/Chest: Effort normal. No respiratory distress.   Abdominal: soft NTND  : No palpable flank masses, no CVA ttp  Musculoskeletal: Normal range of motion.    Neurological: Alert and oriented to person, place, and time.  Psychiatric: Normal mood and affect. Behavior is normal. Thought content normal.      LABORATORY REVIEW:     Lab Results   Component Value Date    BUN 26 (H) 01/26/2024    CREATININE 1.55 (H) 01/26/2024    EGFR 37 (L) 01/26/2024     01/26/2024    K 3.9 01/26/2024     01/26/2024    CO2 23 01/26/2024    CALCIUM 10.1 01/26/2024      Lab Results   Component Value Date    WBC 6.1 01/26/2024    RBC 5.52 (H) 01/26/2024    HGB 13.4 01/26/2024    HCT 43.9 01/26/2024    MCV 80 01/26/2024    MCH 24.3 (L) 01/26/2024    MCHC 30.5 (L)  01/26/2024    RDW 14.3 01/26/2024     01/26/2024         Assessment:      1. Kidney cyst, acquired           Plan:    Reviewed and interpreted patient's kidney function testing   Reviewed and interpreted patient's MRI    Counseled patient that her MRI shows thee a benign renal cyst that require no follow-up    RTC prn     31 minutes total spent on patient's care today; >50% time spent on counseling/coordination of care

## 2024-07-01 ENCOUNTER — OFFICE VISIT (OUTPATIENT)
Dept: UROLOGY | Facility: HOSPITAL | Age: 64
End: 2024-07-01
Payer: COMMERCIAL

## 2024-07-01 DIAGNOSIS — N28.1 KIDNEY CYST, ACQUIRED: ICD-10-CM

## 2024-07-01 PROCEDURE — 99214 OFFICE O/P EST MOD 30 MIN: CPT | Performed by: STUDENT IN AN ORGANIZED HEALTH CARE EDUCATION/TRAINING PROGRAM

## 2024-07-01 PROCEDURE — 3060F POS MICROALBUMINURIA REV: CPT | Performed by: STUDENT IN AN ORGANIZED HEALTH CARE EDUCATION/TRAINING PROGRAM

## 2024-07-01 PROCEDURE — 3048F LDL-C <100 MG/DL: CPT | Performed by: STUDENT IN AN ORGANIZED HEALTH CARE EDUCATION/TRAINING PROGRAM

## 2024-07-02 DIAGNOSIS — N18.32 TYPE 2 DIABETES MELLITUS WITH STAGE 3B CHRONIC KIDNEY DISEASE, WITH LONG-TERM CURRENT USE OF INSULIN (MULTI): Primary | ICD-10-CM

## 2024-07-02 DIAGNOSIS — Z79.4 TYPE 2 DIABETES MELLITUS WITH STAGE 3B CHRONIC KIDNEY DISEASE, WITH LONG-TERM CURRENT USE OF INSULIN (MULTI): Primary | ICD-10-CM

## 2024-07-02 DIAGNOSIS — E11.22 TYPE 2 DIABETES MELLITUS WITH STAGE 3B CHRONIC KIDNEY DISEASE, WITH LONG-TERM CURRENT USE OF INSULIN (MULTI): Primary | ICD-10-CM

## 2024-07-03 RX ORDER — INSULIN HUMAN 500 [IU]/ML
INJECTION, SOLUTION SUBCUTANEOUS
Qty: 60 ML | Refills: 3 | Status: SHIPPED | OUTPATIENT
Start: 2024-07-03

## 2024-07-22 DIAGNOSIS — N28.1 KIDNEY CYST, ACQUIRED: Primary | ICD-10-CM

## 2024-07-30 DIAGNOSIS — I10 ACCELERATED HYPERTENSION: ICD-10-CM

## 2024-07-31 RX ORDER — HYDROCHLOROTHIAZIDE 50 MG/1
50 TABLET ORAL DAILY
Qty: 90 TABLET | Refills: 0 | Status: SHIPPED | OUTPATIENT
Start: 2024-07-31

## 2024-08-29 ENCOUNTER — APPOINTMENT (OUTPATIENT)
Dept: OBSTETRICS AND GYNECOLOGY | Facility: CLINIC | Age: 64
End: 2024-08-29
Payer: COMMERCIAL

## 2024-08-29 VITALS
SYSTOLIC BLOOD PRESSURE: 130 MMHG | HEIGHT: 71 IN | DIASTOLIC BLOOD PRESSURE: 72 MMHG | BODY MASS INDEX: 32.76 KG/M2 | WEIGHT: 234 LBS

## 2024-08-29 DIAGNOSIS — Z01.419 ENCOUNTER FOR GYNECOLOGICAL EXAMINATION WITHOUT ABNORMAL FINDING: Primary | ICD-10-CM

## 2024-08-29 PROCEDURE — 1159F MED LIST DOCD IN RCRD: CPT | Performed by: OBSTETRICS & GYNECOLOGY

## 2024-08-29 PROCEDURE — 3075F SYST BP GE 130 - 139MM HG: CPT | Performed by: OBSTETRICS & GYNECOLOGY

## 2024-08-29 PROCEDURE — 3048F LDL-C <100 MG/DL: CPT | Performed by: OBSTETRICS & GYNECOLOGY

## 2024-08-29 PROCEDURE — 99397 PER PM REEVAL EST PAT 65+ YR: CPT | Performed by: OBSTETRICS & GYNECOLOGY

## 2024-08-29 PROCEDURE — 3078F DIAST BP <80 MM HG: CPT | Performed by: OBSTETRICS & GYNECOLOGY

## 2024-08-29 PROCEDURE — 3008F BODY MASS INDEX DOCD: CPT | Performed by: OBSTETRICS & GYNECOLOGY

## 2024-08-29 PROCEDURE — 3060F POS MICROALBUMINURIA REV: CPT | Performed by: OBSTETRICS & GYNECOLOGY

## 2024-08-29 NOTE — PROGRESS NOTES
Subjective   Anna Santos is a 64 y.o. female here for GYN care.  She has a history of a hysterectomy. She has no postmenopausal bleeding but occasionally has a light discharge.  No dysuria or change in bowel habits.  There is occasional constipation.  She is current on her colonoscopy.    Review of the chart notes a pelvic ultrasound in 2024 that showed a left complex cystic change.  The right ovary was not visualized.  The MRI done  prior to the ultrasound showed a left lobulated lesion.  After the ultrasound they did recommend a pelvic MRI in 3 to 6 months to verify no change in this lesion.   A left lesion was present in .  She has no pelvic pain.    Personal health questionnaire reviewed: yes.     Gynecologic History  No LMP recorded (lmp unknown). Patient has had a hysterectomy.  Contraception: status post hysterectomy  Last vaginal Pap: 2018. Results were: normal  Last mammogram: 22. Results were: normal    Obstetric History  OB History    Para Term  AB Living   0 0 0 0 0 0   SAB IAB Ectopic Multiple Live Births   0 0 0 0 0       Objective   Constitutional: Alert and in no acute distress. Well developed, well nourished.   Head and Face: Head and face: Normal.    Eyes: Normal external exam - nonicteric sclera, extraocular movements intact (EOMI) and no ptosis.   Neck: No neck asymmetry. Supple. Thyroid not enlarged and there were no palpable thyroid nodules.    Pulmonary: No respiratory distress.   Chest: Breasts: Normal appearance, no nipple discharge and no skin changes. Palpation of breasts and axillae: No palpable mass and no axillary lymphadenopathy.   Abdomen: Soft nontender; no abdominal mass palpated. No organomegaly. No hernias.   Genitourinary: External genitalia: Normal. No inguinal lymphadenopathy. Bartholin's Urethral and Skenes Glands: Normal. Urethra: Normal.  Bladder: Normal on palpation. Vagina: Normal. Cervix: Absent.  Uterus: Absent.  Right  Adnexa/parametria: Normal.  Left Adnexa/parametria: Normal.  Inspection of Perianal Area: Normal.   Musculoskeletal: No joint swelling seen, normal movements of all extremities.   Skin: Normal skin color and pigmentation, normal skin turgor, and no rash.   Neurologic: Non-focal. Grossly intact.   Psychiatric: Alert and oriented x 3. Affect normal to patient baseline. Mood: Appropriate.  Physical Exam     Assessment/Plan   Healthy female exam.  This is a 64-year-old female with a normal exam.  She has a history of a hysterectomy, and is high risk HPV negative in 2018.  No Pap smear should be necessary.    Her routine mammogram was ordered with tomosynthesis.    We discussed that there is a left complex adnexal mass unclear if it is related to ovarian tissue.  The recommendation was follow-up MRI with IV contrast and this was ordered.    I will see her in 1 year or sooner as needed.  Mammogram ordered.

## 2024-09-11 ENCOUNTER — HOSPITAL ENCOUNTER (OUTPATIENT)
Dept: RADIOLOGY | Facility: CLINIC | Age: 64
Discharge: HOME | End: 2024-09-11
Payer: COMMERCIAL

## 2024-09-11 VITALS — HEIGHT: 71 IN | BODY MASS INDEX: 32.72 KG/M2 | WEIGHT: 233.69 LBS

## 2024-09-11 DIAGNOSIS — Z01.419 ENCOUNTER FOR GYNECOLOGICAL EXAMINATION WITHOUT ABNORMAL FINDING: ICD-10-CM

## 2024-09-11 PROCEDURE — 77067 SCR MAMMO BI INCL CAD: CPT

## 2024-09-19 ENCOUNTER — APPOINTMENT (OUTPATIENT)
Dept: RADIOLOGY | Facility: HOSPITAL | Age: 64
End: 2024-09-19
Payer: COMMERCIAL

## 2024-09-25 ENCOUNTER — HOSPITAL ENCOUNTER (OUTPATIENT)
Dept: RADIOLOGY | Facility: CLINIC | Age: 64
Discharge: HOME | End: 2024-09-25
Payer: COMMERCIAL

## 2024-09-25 DIAGNOSIS — R93.5 ABNORMAL MRI, PELVIS: ICD-10-CM

## 2024-09-25 PROCEDURE — 72197 MRI PELVIS W/O & W/DYE: CPT

## 2024-09-25 PROCEDURE — A9575 INJ GADOTERATE MEGLUMI 0.1ML: HCPCS | Performed by: OBSTETRICS & GYNECOLOGY

## 2024-09-25 PROCEDURE — 72197 MRI PELVIS W/O & W/DYE: CPT | Performed by: RADIOLOGY

## 2024-09-25 PROCEDURE — 2550000001 HC RX 255 CONTRASTS: Performed by: OBSTETRICS & GYNECOLOGY

## 2024-09-25 RX ORDER — GADOTERATE MEGLUMINE 376.9 MG/ML
20 INJECTION INTRAVENOUS
Status: COMPLETED | OUTPATIENT
Start: 2024-09-25 | End: 2024-09-25

## 2024-09-26 NOTE — RESULT ENCOUNTER NOTE
The MRI looks reassuring.  They did compare it to the one that was done 7 months ago, but also to imaging on CT scan from December 2020.  They do not see any changes that are concerning, the left ovarian cyst is smaller than 4 years ago, which is consistent with a benign etiology.  No follow-up MRI or ultrasound is recommended at this time.  I will see you routinely in the office.

## 2024-10-14 DIAGNOSIS — N18.32 TYPE 2 DIABETES MELLITUS WITH STAGE 3B CHRONIC KIDNEY DISEASE, WITH LONG-TERM CURRENT USE OF INSULIN (MULTI): ICD-10-CM

## 2024-10-14 DIAGNOSIS — E11.22 TYPE 2 DIABETES MELLITUS WITH STAGE 3B CHRONIC KIDNEY DISEASE, WITH LONG-TERM CURRENT USE OF INSULIN (MULTI): ICD-10-CM

## 2024-10-14 DIAGNOSIS — Z79.4 TYPE 2 DIABETES MELLITUS WITH STAGE 3B CHRONIC KIDNEY DISEASE, WITH LONG-TERM CURRENT USE OF INSULIN (MULTI): ICD-10-CM

## 2024-10-14 RX ORDER — SEMAGLUTIDE 1.34 MG/ML
1 INJECTION, SOLUTION SUBCUTANEOUS
Qty: 9 ML | Refills: 2 | Status: SHIPPED | OUTPATIENT
Start: 2024-10-14

## 2024-10-14 RX ORDER — INSULIN HUMAN 500 [IU]/ML
INJECTION, SOLUTION SUBCUTANEOUS EVERY MORNING
Qty: 60 ML | Refills: 3 | Status: SHIPPED | OUTPATIENT
Start: 2024-10-14

## 2024-10-21 DIAGNOSIS — Z79.4 TYPE 2 DIABETES MELLITUS WITH STAGE 3B CHRONIC KIDNEY DISEASE, WITH LONG-TERM CURRENT USE OF INSULIN (MULTI): ICD-10-CM

## 2024-10-21 DIAGNOSIS — N18.32 TYPE 2 DIABETES MELLITUS WITH STAGE 3B CHRONIC KIDNEY DISEASE, WITH LONG-TERM CURRENT USE OF INSULIN (MULTI): ICD-10-CM

## 2024-10-21 DIAGNOSIS — E11.22 TYPE 2 DIABETES MELLITUS WITH STAGE 3B CHRONIC KIDNEY DISEASE, WITH LONG-TERM CURRENT USE OF INSULIN (MULTI): ICD-10-CM

## 2024-10-21 RX ORDER — INSULIN HUMAN 500 [IU]/ML
INJECTION, SOLUTION SUBCUTANEOUS EVERY MORNING
Qty: 60 ML | Refills: 3 | Status: CANCELLED | OUTPATIENT
Start: 2024-10-21

## 2024-10-26 DIAGNOSIS — I10 ACCELERATED HYPERTENSION: ICD-10-CM

## 2024-10-28 RX ORDER — HYDROCHLOROTHIAZIDE 50 MG/1
50 TABLET ORAL DAILY
Qty: 90 TABLET | Refills: 0 | Status: SHIPPED | OUTPATIENT
Start: 2024-10-28

## 2024-12-17 ENCOUNTER — APPOINTMENT (OUTPATIENT)
Dept: ENDOCRINOLOGY | Facility: CLINIC | Age: 64
End: 2024-12-17
Payer: COMMERCIAL

## 2024-12-17 ENCOUNTER — LAB (OUTPATIENT)
Dept: LAB | Facility: LAB | Age: 64
End: 2024-12-17
Payer: COMMERCIAL

## 2024-12-17 ENCOUNTER — TELEPHONE (OUTPATIENT)
Dept: PRIMARY CARE | Facility: CLINIC | Age: 64
End: 2024-12-17

## 2024-12-17 VITALS — SYSTOLIC BLOOD PRESSURE: 138 MMHG | DIASTOLIC BLOOD PRESSURE: 74 MMHG | WEIGHT: 234 LBS | BODY MASS INDEX: 32.76 KG/M2

## 2024-12-17 DIAGNOSIS — N18.32 TYPE 2 DIABETES MELLITUS WITH STAGE 3B CHRONIC KIDNEY DISEASE, WITH LONG-TERM CURRENT USE OF INSULIN (MULTI): Primary | ICD-10-CM

## 2024-12-17 DIAGNOSIS — I10 BENIGN ESSENTIAL HYPERTENSION: ICD-10-CM

## 2024-12-17 DIAGNOSIS — E11.22 TYPE 2 DIABETES MELLITUS WITH STAGE 3B CHRONIC KIDNEY DISEASE, WITH LONG-TERM CURRENT USE OF INSULIN (MULTI): ICD-10-CM

## 2024-12-17 DIAGNOSIS — E11.22 TYPE 2 DIABETES MELLITUS WITH STAGE 3B CHRONIC KIDNEY DISEASE, WITH LONG-TERM CURRENT USE OF INSULIN (MULTI): Primary | ICD-10-CM

## 2024-12-17 DIAGNOSIS — E78.2 MIXED HYPERLIPIDEMIA: ICD-10-CM

## 2024-12-17 DIAGNOSIS — Z79.4 TYPE 2 DIABETES MELLITUS WITH STAGE 3B CHRONIC KIDNEY DISEASE, WITH LONG-TERM CURRENT USE OF INSULIN (MULTI): ICD-10-CM

## 2024-12-17 DIAGNOSIS — Z79.4 TYPE 2 DIABETES MELLITUS WITH STAGE 3B CHRONIC KIDNEY DISEASE, WITH LONG-TERM CURRENT USE OF INSULIN (MULTI): Primary | ICD-10-CM

## 2024-12-17 DIAGNOSIS — N18.32 TYPE 2 DIABETES MELLITUS WITH STAGE 3B CHRONIC KIDNEY DISEASE, WITH LONG-TERM CURRENT USE OF INSULIN (MULTI): ICD-10-CM

## 2024-12-17 LAB
ALT SERPL W P-5'-P-CCNC: 42 U/L (ref 7–45)
ANION GAP SERPL CALC-SCNC: 14 MMOL/L (ref 10–20)
BUN SERPL-MCNC: 34 MG/DL (ref 6–23)
CALCIUM SERPL-MCNC: 10.3 MG/DL (ref 8.6–10.6)
CHLORIDE SERPL-SCNC: 95 MMOL/L (ref 98–107)
CO2 SERPL-SCNC: 29 MMOL/L (ref 21–32)
CREAT SERPL-MCNC: 1.89 MG/DL (ref 0.5–1.05)
CREAT UR-MCNC: 60.7 MG/DL (ref 20–320)
EGFRCR SERPLBLD CKD-EPI 2021: 29 ML/MIN/1.73M*2
EST. AVERAGE GLUCOSE BLD GHB EST-MCNC: 197 MG/DL
GLUCOSE SERPL-MCNC: 192 MG/DL (ref 74–99)
HBA1C MFR BLD: 8.5 %
MICROALBUMIN UR-MCNC: <7 MG/L
MICROALBUMIN/CREAT UR: NORMAL MG/G{CREAT}
POTASSIUM SERPL-SCNC: 4.6 MMOL/L (ref 3.5–5.3)
SODIUM SERPL-SCNC: 133 MMOL/L (ref 136–145)
TSH SERPL-ACNC: 0.64 MIU/L (ref 0.44–3.98)

## 2024-12-17 PROCEDURE — 99214 OFFICE O/P EST MOD 30 MIN: CPT | Performed by: INTERNAL MEDICINE

## 2024-12-17 PROCEDURE — 80048 BASIC METABOLIC PNL TOTAL CA: CPT

## 2024-12-17 PROCEDURE — 82570 ASSAY OF URINE CREATININE: CPT

## 2024-12-17 PROCEDURE — 3060F POS MICROALBUMINURIA REV: CPT | Performed by: INTERNAL MEDICINE

## 2024-12-17 PROCEDURE — 3078F DIAST BP <80 MM HG: CPT | Performed by: INTERNAL MEDICINE

## 2024-12-17 PROCEDURE — 3048F LDL-C <100 MG/DL: CPT | Performed by: INTERNAL MEDICINE

## 2024-12-17 PROCEDURE — 3075F SYST BP GE 130 - 139MM HG: CPT | Performed by: INTERNAL MEDICINE

## 2024-12-17 PROCEDURE — 82043 UR ALBUMIN QUANTITATIVE: CPT

## 2024-12-17 PROCEDURE — 84460 ALANINE AMINO (ALT) (SGPT): CPT

## 2024-12-17 PROCEDURE — 36415 COLL VENOUS BLD VENIPUNCTURE: CPT

## 2024-12-17 PROCEDURE — 84443 ASSAY THYROID STIM HORMONE: CPT

## 2024-12-17 PROCEDURE — 83036 HEMOGLOBIN GLYCOSYLATED A1C: CPT

## 2024-12-17 RX ORDER — BLOOD-GLUCOSE SENSOR
EACH MISCELLANEOUS
Qty: 9 EACH | Refills: 3 | Status: SHIPPED | OUTPATIENT
Start: 2024-12-17

## 2024-12-17 NOTE — PROGRESS NOTES
Patient ID: Anna Santos is a 64 y.o. female who presents for Follow-up.  HPI  The patient comes in for follow up.    She was last seen February 10, 2023.    She has type 2 diabetes complicated by retinopathy nephropathy neuropathy hypertension hyperlipidemia.    She continues on U-500 insulin and the tandem control IQ pump using Dexcom 6.    She is on Ozempic 1 mg Jardiance 25 mg.    She has been on the Ozempic 2 mg but cut back when the 2 mg was not available.    She continues to get occasional bloating and constipation but it is not related to the Ozempic timing.    Her sugars have been under good control although about twice a week she gets low blood sugars that she typically sleeps through.    Physically she has no other complaints.    ROS  Comprehensive review of systems is negative.    Objective   Physical Exam  There were no vitals taken for this visit.   Vitals:    12/17/24 1317   Weight: 106 kg (234 lb)      Body mass index is 32.76 kg/m².      Weight 234 down 2 pounds    ENT normal. No adenopathy  Fundi normal  Thyroid palpable and normal. No nodules  Chest clear to auscultation  Heart sounds are normal  Abdomen nontender. Bowel sounds normal. No organomegaly  Feet are okay  Decreased vibration and monofilament sensation normal pulses, no lesions    Current Outpatient Medications   Medication Sig Dispense Refill    albuterol 90 mcg/actuation inhaler Inhale 1 puff every 6 hours if needed for wheezing. 18 g 3    aspirin 81 mg EC tablet Take 1 tablet (81 mg) by mouth once daily.      atorvastatin (Lipitor) 80 mg tablet Take 1 tablet (80 mg) by mouth once daily. 90 tablet 3    blood sugar diagnostic (Contour Next Test Strips) strip CHECK BLOOD SUAR 4-5 TIMES PER DAY FOR INSULIN REQUIRING T2DM 100 strip 11    budesonide-formoteroL (Symbicort) 160-4.5 mcg/actuation inhaler Inhale 2 puffs 2 times a day.      cholecalciferol (Vitamin D-3) 5,000 Units tablet Take by mouth.      Dexcom G4 platinum   "(Dexcom G6 ) misc USE AS DIRECTED 3 each 11    Dexcom G6 Sensor device APPLY TO UPPER ARM AND CHANGE EVERY 10 DAYS      Dexcom G6 Transmitter device USE AS DIRECTED      fluticasone (Flonase) 50 mcg/actuation nasal spray Administer 1 spray into affected nostril(s) 2 times a day.      hydroCHLOROthiazide (HYDRODiuril) 50 mg tablet Take 1 tablet by mouth once daily. 90 tablet 0    insulin regular (HumuLIN R U-500, Conc, Insulin) 500 unit/mL CONCENTRATED injection Inject under the skin once daily in the morning. Take as directed per insulin instructions. Use U-500 insulin syringe.INJECT A TOTAL DAILY DOSE  OF 50 UNITS IN INSULIN PUMP(U500) 60 mL 3    insulin syringe-needle U-100 (BD Insulin Syringe Ultra-Fine) 1 mL 30 gauge x 1/2\" syringe       Jardiance 25 mg Take 1 tablet (25 mg) by mouth once daily. 90 tablet 3    magnesium oxide (Mag-Ox) 400 mg (241.3 mg magnesium) tablet Take 1 tablet (400 mg) by mouth once daily.      metoprolol tartrate (Lopressor) 25 mg tablet TAKE 1 TABLET TWICE A  tablet 1    metoprolol tartrate (Lopressor) 25 mg tablet Take 1 tablet (25 mg) by mouth 2 times a day. 180 tablet 3    ONETOUCH DELICA LANCETS MISC 4 times a day.      pantoprazole (ProtoNix) 40 mg EC tablet Take 1 tablet (40 mg) by mouth once daily in the morning. Take before meals.      polyethylene glycol (Miralax) 17 gram/dose powder Take 1 Dose by mouth once daily at bedtime.      semaglutide (Ozempic) 1 mg/dose (4 mg/3 mL) pen injector Inject 1 mg under the skin 1 (one) time per week. 9 mL 2    spironolactone (Aldactone) 50 mg tablet Take 1 tablet (50 mg) by mouth 2 times a day. 180 tablet 3    traMADol (Ultram) 50 mg tablet Take 1 tablet (50 mg) by mouth every 4 hours if needed.       No current facility-administered medications for this visit.       Assessment/Plan     1.  Type 2 diabetes on insulin uncontrolled  2.  Hypertension  3.  Hyperlipidemia  4.  Renal insufficiency    Will check hemoglobin A1c ALT BMP " TSH urine microalbumin today.    Will upgrade to the Dexcom 7.    Will increase the Ozempic to 2 mg.    She will take a look at her blood sugars and see where it is that she is dropping overnight and make adjustments in her basal rate accordingly.    She will continue work on diet and exercise.    She will follow-up with me in 6 months sooner as needed.

## 2024-12-25 ENCOUNTER — HOSPITAL ENCOUNTER (EMERGENCY)
Facility: HOSPITAL | Age: 64
Discharge: HOME | End: 2024-12-26
Attending: EMERGENCY MEDICINE
Payer: COMMERCIAL

## 2024-12-25 DIAGNOSIS — H53.8 BLURRED VISION, RIGHT EYE: Primary | ICD-10-CM

## 2024-12-25 PROCEDURE — 99285 EMERGENCY DEPT VISIT HI MDM: CPT | Performed by: EMERGENCY MEDICINE

## 2024-12-25 PROCEDURE — 99281 EMR DPT VST MAYX REQ PHY/QHP: CPT | Performed by: EMERGENCY MEDICINE

## 2024-12-25 ASSESSMENT — COLUMBIA-SUICIDE SEVERITY RATING SCALE - C-SSRS
1. IN THE PAST MONTH, HAVE YOU WISHED YOU WERE DEAD OR WISHED YOU COULD GO TO SLEEP AND NOT WAKE UP?: NO
6. HAVE YOU EVER DONE ANYTHING, STARTED TO DO ANYTHING, OR PREPARED TO DO ANYTHING TO END YOUR LIFE?: NO
2. HAVE YOU ACTUALLY HAD ANY THOUGHTS OF KILLING YOURSELF?: NO

## 2024-12-25 ASSESSMENT — PAIN - FUNCTIONAL ASSESSMENT: PAIN_FUNCTIONAL_ASSESSMENT: 0-10

## 2024-12-25 ASSESSMENT — PAIN SCALES - GENERAL: PAINLEVEL_OUTOF10: 0 - NO PAIN

## 2024-12-26 ENCOUNTER — HOSPITAL ENCOUNTER (EMERGENCY)
Facility: HOSPITAL | Age: 64
Discharge: HOME | End: 2024-12-26
Attending: STUDENT IN AN ORGANIZED HEALTH CARE EDUCATION/TRAINING PROGRAM
Payer: COMMERCIAL

## 2024-12-26 VITALS
SYSTOLIC BLOOD PRESSURE: 134 MMHG | HEART RATE: 82 BPM | OXYGEN SATURATION: 96 % | DIASTOLIC BLOOD PRESSURE: 76 MMHG | RESPIRATION RATE: 16 BRPM

## 2024-12-26 VITALS
OXYGEN SATURATION: 96 % | HEIGHT: 71 IN | RESPIRATION RATE: 18 BRPM | TEMPERATURE: 96.8 F | BODY MASS INDEX: 32.9 KG/M2 | WEIGHT: 235 LBS | DIASTOLIC BLOOD PRESSURE: 82 MMHG | HEART RATE: 81 BPM | SYSTOLIC BLOOD PRESSURE: 146 MMHG

## 2024-12-26 DIAGNOSIS — H43.11 VITREOUS HEMORRHAGE OF RIGHT EYE (MULTI): Primary | ICD-10-CM

## 2024-12-26 PROCEDURE — 76512 OPH US DX B-SCAN: CPT

## 2024-12-26 PROCEDURE — 99283 EMERGENCY DEPT VISIT LOW MDM: CPT | Performed by: STUDENT IN AN ORGANIZED HEALTH CARE EDUCATION/TRAINING PROGRAM

## 2024-12-26 PROCEDURE — 99284 EMERGENCY DEPT VISIT MOD MDM: CPT | Performed by: STUDENT IN AN ORGANIZED HEALTH CARE EDUCATION/TRAINING PROGRAM

## 2024-12-26 PROCEDURE — 99284 EMERGENCY DEPT VISIT MOD MDM: CPT

## 2024-12-26 PROCEDURE — 2500000005 HC RX 250 GENERAL PHARMACY W/O HCPCS

## 2024-12-26 PROCEDURE — 76512 OPH US DX B-SCAN: CPT | Performed by: STUDENT IN AN ORGANIZED HEALTH CARE EDUCATION/TRAINING PROGRAM

## 2024-12-26 RX ORDER — TETRACAINE HYDROCHLORIDE 5 MG/ML
1 SOLUTION OPHTHALMIC ONCE
Status: COMPLETED | OUTPATIENT
Start: 2024-12-26 | End: 2024-12-26

## 2024-12-26 RX ORDER — TETRACAINE HYDROCHLORIDE 5 MG/ML
SOLUTION OPHTHALMIC
Status: COMPLETED
Start: 2024-12-26 | End: 2024-12-26

## 2024-12-26 RX ADMIN — TETRACAINE HYDROCHLORIDE 1 DROP: 5 SOLUTION OPHTHALMIC at 02:16

## 2024-12-26 SDOH — HEALTH STABILITY: MENTAL HEALTH: SUICIDE ASSESSMENT: ADULT (C-SSRS)

## 2024-12-26 SDOH — HEALTH STABILITY: MENTAL HEALTH: HAVE YOU EVER DONE ANYTHING, STARTED TO DO ANYTHING, OR PREPARED TO DO ANYTHING TO END YOUR LIFE?: NO

## 2024-12-26 SDOH — HEALTH STABILITY: MENTAL HEALTH: HAVE YOU WISHED YOU WERE DEAD OR WISHED YOU COULD GO TO SLEEP AND NOT WAKE UP?: NO

## 2024-12-26 SDOH — HEALTH STABILITY: MENTAL HEALTH: BEHAVIORAL HEALTH(WDL): WITHIN DEFINED LIMITS

## 2024-12-26 SDOH — HEALTH STABILITY: MENTAL HEALTH: HAVE YOU ACTUALLY HAD ANY THOUGHTS OF KILLING YOURSELF?: NO

## 2024-12-26 ASSESSMENT — PAIN SCALES - GENERAL
PAINLEVEL_OUTOF10: 0 - NO PAIN
PAINLEVEL_OUTOF10: 0 - NO PAIN

## 2024-12-26 ASSESSMENT — LIFESTYLE VARIABLES
EVER FELT BAD OR GUILTY ABOUT YOUR DRINKING: NO
TOTAL SCORE: 0
HAVE PEOPLE ANNOYED YOU BY CRITICIZING YOUR DRINKING: NO
EVER HAD A DRINK FIRST THING IN THE MORNING TO STEADY YOUR NERVES TO GET RID OF A HANGOVER: NO
HAVE YOU EVER FELT YOU SHOULD CUT DOWN ON YOUR DRINKING: NO

## 2024-12-26 ASSESSMENT — VISUAL ACUITY
OS: 20/30
OU: 20/30
OD: CAN'T SEE

## 2024-12-26 ASSESSMENT — COLUMBIA-SUICIDE SEVERITY RATING SCALE - C-SSRS
2. HAVE YOU ACTUALLY HAD ANY THOUGHTS OF KILLING YOURSELF?: NO
6. HAVE YOU EVER DONE ANYTHING, STARTED TO DO ANYTHING, OR PREPARED TO DO ANYTHING TO END YOUR LIFE?: NO
1. IN THE PAST MONTH, HAVE YOU WISHED YOU WERE DEAD OR WISHED YOU COULD GO TO SLEEP AND NOT WAKE UP?: NO

## 2024-12-26 ASSESSMENT — PAIN - FUNCTIONAL ASSESSMENT: PAIN_FUNCTIONAL_ASSESSMENT: 0-10

## 2024-12-26 NOTE — ED TRIAGE NOTES
Right eye pain since Friday with some floaters. Vision loss since this morning. Hx of T2DM, Htn, and cataracts. Pt a/ox4. VSS.

## 2024-12-26 NOTE — CONSULTS
History of Present Illness:     Anna Santos is a 64 y.o. female with PMHx of DM2 (last A1c 8.5%), HTN and POHx of Pseudophakia (>5 years ago per pt) OU, PDR OU (previously followed at [facility] > 5 years ago) presenting for painless loss of vision right eye.    Patient reports that she noticed 12/24/24 PM that she was experiencing increasing floaters in her right eye to the point that her vision was like looking through a web. She states that when she woke up 12/25/24 AM she had very difficult time seeing out of her right eye. Denies photopsias, new floaters, diplopia, curtaining in vision, eye pain/redness/irritation.    Patient states that she has not had any routine eye care for at least the past 5 years. She at one time followed with [facility] for monitoring of her diabetic retinopathy and has previously received intravitreal injections and undergone lasering of her retina (per patient). Denies any recent ocular trauma, surgeries other than cataract surgery as mentioned, any ocular medications.       PMHx:   Past Medical History:   Diagnosis Date    Acute candidiasis of vulva and vagina 11/21/2013    Vaginal candidiasis    Acute upper respiratory infection, unspecified 09/24/2015    Viral URI with cough    Diabetes mellitus type II, controlled (Multi)     Encounter for gynecological examination (general) (routine) without abnormal findings 08/22/2016    Encounter for gynecological examination    Encounter for gynecological examination (general) (routine) without abnormal findings 10/21/2014    Encounter for routine gynecological examination    Encounter for immunization 11/19/2020    Need for prophylactic vaccination and inoculation against influenza    Encounter for screening mammogram for malignant neoplasm of breast 08/22/2016    Visit for screening mammogram    Personal history of other diseases of the respiratory system 03/11/2014    History of sinusitis     Medications: As per Medication  List  Allergies: Ace inhibitors and Iodinated contrast media  Past Ocular History: as per above HPI    Physical Examination:     Base Eye Exam       Visual Acuity (Snellen - Linear)         Right Left    Near cc CF 1' 20/25      Correction: Glasses   +2.00D readers             Tonometry (Tonopen, 3:41 AM)         Right Left    Pressure 9 9              Pupils         Dark Light Shape React APD    Right 4 2 Round Brisk None    Left 4 2 Round Brisk None              Visual Fields (Counting fingers)         Left Right     Full    Restricted OD             Extraocular Movement         Right Left     Full, Ortho Full, Ortho              Neuro/Psych       Oriented x3: Yes              Dilation       Both eyes: 1% Mydriacyl & 2.5% Sd  @ 3:38 AM                  Slit Lamp and Fundus Exam       External Exam         Right Left    External Normal Normal              Slit Lamp Exam         Right Left    Lids/Lashes Normal Normal    Conjunctiva/Sclera White and quiet White and quiet    Cornea 2-3+ SPK centrally 2-3+ SPK centrally    Anterior Chamber Deep and quiet Deep and quiet    Iris Round and reactive Round and reactive    Lens PCIOL PCIOL    Anterior Vitreous Vitreous syneresis Vitreous syneresis              Fundus Exam         Right Left    Disc No view Normal    C/D Ratio  0.4    Macula No view Few DBH, MA, exudates    Vessels No view Vascular attenuation    Periphery No view No tears or breaks. Small 0.5DD scar ST?, numerous DBH scattered throughout periphery, Intraretinal hemorrhage superiorly and inferiorly                  B Scan performed OD with hyperechoic material seen but without any concerns for tears or detachment.    Assessment and Plan:    #Vitreous Hemorrhage right eye  - No tears or detachment seen on exam or with B-scan  - Recommend elevated head of bed, avoid heavy bending or lifting  - Avoid NSAIDs  - retinal detachment (RD) return precautions discussed including new onset flashes or curtain in  vision  - Will arrange for close retina follow-up     Recommend follow-up with  [facility], within 1 week. Please call [phone] (EYES) to make appointment.      Hardy Dickey MD  Ophthalmology PGY2      Ophthalmology Adult Pager - [phone]  Ophthalmology Pediatrics Pager - [phone]    For adult follow-up appointments, call: [phone]  For pediatric follow-up appointments, call: [phone]    Brief Key of Common Ophthalmology Abbreviations:  OD: right eye  OS: left eye  OU: both eyes  VA: visual acuity   IOP: intraocular pressure  EOMs: extraocular movements  CVF: confrontal visual fields  DFE: dilated fundus exam  DBH: dot blot hemorrhage  CWS: cotton wool spot  AC: anterior chamber  RD: retinal detachment  PVD: posterior vitreous detachment    NOTE: This note is not finalized until attending reviews and signs.

## 2024-12-26 NOTE — ED PROCEDURE NOTE
Procedure    Performed by: Jerry Villa DO  Authorized by: Tera Green MD              Ocular Indications: visual change and Right eye      Comments: No obvious signs of retinal detachment, or posterior vitreous detachment.  Noted to have hyperechoic markings within the posterior chamber.               Jerry Villa DO  Resident  12/26/24 1303

## 2024-12-26 NOTE — DISCHARGE INSTRUCTIONS
You were seen and evaluated in the emergency department tonight for concerns for possible retinal detachment versus posterior vitreous detachment.  At this time you are diagnosed with vitreous hemorrhage of the right eye.  Recommended follow-up with outpatient ophthalmology within 1 week.  If you experience any total sudden vision loss, complete blackout, eye pain, nausea vomiting please return to the emergency room department immediately     [facility], within 1 week. Please call [phone] (EYES) to make appointment.     Avoid taking Tylenol and Ibuprofen.

## 2024-12-26 NOTE — DISCHARGE INSTRUCTIONS
Please go to [facility] for an emergent ophthalmology evaluation.

## 2024-12-26 NOTE — ED PROVIDER NOTES
"Emergency Department Provider Note        History of Present Illness     History provided by: Patient  Limitations to History: None  External Records Reviewed with Brief Summary: None    HPI:  Anna Santos is a 64 y.o. female with PMHx of diabetes, hypertension presenting to the ED as a transfer from [facility] in the setting of right eye vision changes.  Patient reports on  she had noticed some floaters in her right eye, described as \"spiderweb\" then on  day she woke up with significant blurred vision, denies total vision loss, denies eye pain.  Additionally patient denies headache, fever/chills, nausea/vomiting.  Patient reports she has not seen any eye doctor in many years.    Physical Exam   Triage vitals:  T    HR 86  BP (!) 148/99  RR 17  O2 98 % None (Room air)    General: Awake, alert, in no acute distress  Eyes: Gaze conjugate.  No scleral icterus or injection  HENT: Normo-cephalic, atraumatic. No stridor  CV: Regular rate, regular rhythm. Radial pulses 2+ bilaterally  Resp: Breathing non-labored, speaking in full sentences.  Clear to auscultation bilaterally  GI: Soft, non-distended, non-tender. No rebound or guarding.  MSK/Extremities: No gross bony deformities. Moving all extremities  Skin: Warm. Appropriate color  Neuro: Alert. Oriented. Face symmetric. Speech is fluent.  Gross strength and sensation intact in b/l UE and LEs  Psych: Appropriate mood and affect    Medical Decision Making & ED Course   Medical Decision Makin y.o. female PMHx of HTN, DM presenting for right monocular vision changes onset yesterday.  On arrival, vital signs unremarkable.  Physical exam is noted for finger counting to the right eye, no pain with extraocular eye motion, no photophobia, PERRLA.  Differential diagnosis to include but not limited are retinal detachment, history of vitreous detachment, vitreous hemorrhage, retinal hemorrhage, acute angle-closure glaucoma.  Low suspicion of acute " angle-closure glaucoma given patient's intraocular pressures are within normal limits bilaterally (15), and patient does not have any associated pain within the eye or behind the eye.  POCUS showed no obvious signs of posterior vitreous detachment or retinal detachment, there were hyperechoic particles floating throughout the posterior chamber more suggestive of vitreous hemorrhage.  Will consult ophthalmology for further evaluation and management.  ----    Independent Result Review and Interpretation: Relevant laboratory and radiographic results were reviewed and independently interpreted by myself.  As necessary, they are commented on in the ED Course.    Chronic conditions affecting the patient's care: As documented above in [facility]    The patient was discussed with the following consultants/services:  Ophthalmology    Care Considerations: As documented above in [facility]    ED Course:  ED Course as of 12/26/24 0524   Thu Dec 26, 2024   0232 Ocular pressures bilaterally 15.  Low suspicion for acute angle-closure glaucoma.  Will consult Optho. [JANNA]   7280 Emergency Medicine Attending Attestation:     [unfilled]    The patient was seen by the resident/fellow.  I have personally performed a substantive portion of the encounter.  I have seen and examined the patient; agree with the workup, evaluation, MDM, management and diagnosis.  The care plan has been discussed with the resident; I have reviewed the resident's note and agree with the documented findings.      Patient presents the emergency department as a transfer from [facility] for concerns for possible retinal detachment with vision loss of the right eye that started yesterday with floaters.  Patient is sitting comfortably in the gurney on my exam.  She has vision only to fingers in the right side and 20/20 vision on the left side.  She has normal eye pressures.  She has normal conjunctiva with intact extraocular eye movements and PERRLA.  Ultrasound that does not show any  appreciable retinal detachment although does show concerns for possible vitreous hemorrhage.  Will plan to consult ophthalmology.    Tera Green MD   [AM]      ED Course User Index  [AM] Tera Green MD  [JANNA] Jerry Villa DO         Diagnoses as of 12/26/24 0524   Vitreous hemorrhage of right eye (Multi)     Disposition   As a result of the work-up, the patient was discharged home.  she was informed of her diagnosis and instructed to come back with any concerns or worsening of condition.  she and was agreeable to the plan as discussed above.  she was given the opportunity to ask questions.  All of the patient's questions were answered.    Procedures   Procedures    Patient seen and discussed with ED attending physician.    Jerry Villa DO  Emergency Medicine     Jerry Villa DO  Resident  12/26/24 0524

## 2024-12-26 NOTE — ED PROVIDER NOTES
HPI   Chief Complaint   Patient presents with    Loss of Vision     Right eye pain since Friday with some floaters. Vision loss since this morning. Hx of T2DM, Htn, and cataracts. Pt a/ox4. VSS.       HPI: []  64-year-old  female with a history of hypertension, diabetes comes in with right eye vision changes.  She states yesterday she has floaters in the eye today she had blurred vision no vision loss no eye pain.  No headache no temporal pain.  No chest pain pressure heaviness fever chills nausea vomit diarrhea cough congestion incontinence seizures syncope or Nisky be no hematemesis melena hematochezia no hemoptysis.  No history of glaucoma or cataracts.  No eye surgeries.    Past history: Hypertension, diabetes  Social history: Patient denies current tobacco alcohol drug abuse.  REVIEW OF SYSTEMS:    GENERAL.: No weight loss, fatigue, anorexia, insomnia, fever.    EYES: Positive for blurred and vision loss, double vision, drainage, eye pain.    ENT: No pharyngitis, dry mouth.    CARDIOPULMONARY: No chest pain, palpitations, syncope, near syncope. No shortness of breath, cough, hemoptysis.    GI: No abdominal pain, change in bowel habits, melena, hematemesis, hematochezia, nausea, vomiting, diarrhea.    : No discharge, dysuria, frequency, urgency, hematuria.    MS: No limb pain, joint pain, joint swelling.    SKIN: No rashes.    PSYCH: No depression, anxiety, suicidality, homicidality.    Review of systems is otherwise negative unless stated above or in history of present illness.  Social history, family history, allergies reviewed.  PHYSICAL EXAM:    GENERAL: Vitals noted, no distress. Alert and oriented  x 3. Non-toxic.      EENT: TMs clear. Posterior oropharynx unremarkable. No meningismus. No LAD.  Negative temporal pain.  Eyes: Pupils equal round and react light accommodation EOMs are intact  NECK: Supple. Nontender. No midline tenderness.     CARDIAC: Regular, rate, rhythm. No murmurs rubs or  gallops. No JVD    PULMONARY: Lungs clear bilaterally with good aeration. No wheezes rales or rhonchi. No respiratory distress.     ABDOMEN: Soft, nonsurgical. Nontender. No peritoneal signs. Normoactive bowel sounds. No pulsatile masses.     EXTREMITIES: No peripheral edema. Negative Homans bilaterally, no cords.    SKIN: No rash. Intact.     NEURO: No focal neurologic deficits, NIH score of 0. Cranial nerves normal as tested from II through XII.     MEDICAL DECISION MAKING:  Consult: Patient be discussed with ophthalmology they recommended [place] to [facility] for emergent ophthalmology consultation.    ED course: Patient remained neurologic intact    Impression: #1 right eye blurred vision probably due to other detached retina versus vitreous detachment versus vitreous hemorrhage    Plan/MDM: 64-year-old female history of hypertension diabetes comes in with right eye blurred vision, she had floaters yesterday and today she had blurred vision she has no vision loss per se, she has very poor vision currently barely finger counting.  Please refer to nursing notes for more accurate visual equity.  Currently my suspicion for stroke TIA acute closing glaucoma or temporal arteritis is low.  Patient will be transferred to [facility] for emergent ophthalmology consultation.  Unfortunately due to capacity and high volumes unable to perform B-scan in the ED.              Patient History   Past Medical History:   Diagnosis Date    Acute candidiasis of vulva and vagina 11/21/2013    Vaginal candidiasis    Acute upper respiratory infection, unspecified 09/24/2015    Viral URI with cough    Diabetes mellitus type II, controlled (Multi)     Encounter for gynecological examination (general) (routine) without abnormal findings 08/22/2016    Encounter for gynecological examination    Encounter for gynecological examination (general) (routine) without abnormal findings 10/21/2014    Encounter for routine gynecological examination     Encounter for immunization 11/19/2020    Need for prophylactic vaccination and inoculation against influenza    Encounter for screening mammogram for malignant neoplasm of breast 08/22/2016    Visit for screening mammogram    Personal history of other diseases of the respiratory system 03/11/2014    History of sinusitis     Past Surgical History:   Procedure Laterality Date    CHOLECYSTECTOMY      COLONOSCOPY  11/28/2014    Complete Colonoscopy    COLONOSCOPY  10/2019    rpt 10-29    ESOPHAGOGASTRODUODENOSCOPY  11/21/2013    Diagnostic Esophagogastroduodenoscopy    HYSTERECTOMY  09/11/2013    Hysterectomy     Family History   Problem Relation Name Age of Onset    Dementia Mother      Diabetes Mother      Coronary artery disease Mother      Lung cancer Father      Coronary artery disease Father      Diabetes Father       Social History     Tobacco Use    Smoking status: Never    Smokeless tobacco: Never   Substance Use Topics    Alcohol use: Never    Drug use: Never       Physical Exam   ED Triage Vitals [12/25/24 2252]   Temperature Heart Rate Respirations BP   36 °C (96.8 °F) 83 17 145/74      Pulse Ox Temp Source Heart Rate Source Patient Position   98 % Temporal Monitor Sitting      BP Location FiO2 (%)     Left arm --       Physical Exam      ED Course & MDM   ED Course as of 12/26/24 0033   Thu Dec 26, 2024   0024 Patient history and examination is consistent with the vitreous hemorrhage/detachment/retinal detachment, discussed with ophthalmology [facility] and transfer to [facility] ED recommended for further evaluation.  Patient's family will drive her over there. [MT]      ED Course User Index  [MT] Anabell Mclaughlin MD         Diagnoses as of 12/26/24 0033   Blurred vision, right eye                 No data recorded     Alisa Coma Scale Score: 15 (12/25/24 2254 : Dillon Kam RN)                           Medical Decision Making      Procedure  Procedures     Anabell Mclaughlin MD  12/26/24  0036

## 2025-01-08 DIAGNOSIS — I10 ACCELERATED HYPERTENSION: ICD-10-CM

## 2025-01-09 ENCOUNTER — APPOINTMENT (OUTPATIENT)
Dept: OPHTHALMOLOGY | Facility: CLINIC | Age: 65
End: 2025-01-09
Payer: COMMERCIAL

## 2025-01-09 DIAGNOSIS — E11.3513 PROLIFERATIVE DIABETIC RETINOPATHY OF BOTH EYES WITH MACULAR EDEMA ASSOCIATED WITH TYPE 2 DIABETES MELLITUS: ICD-10-CM

## 2025-01-09 DIAGNOSIS — E11.319 DIABETIC RETINOPATHY OF BOTH EYES WITHOUT MACULAR EDEMA ASSOCIATED WITH TYPE 2 DIABETES MELLITUS, UNSPECIFIED RETINOPATHY SEVERITY: ICD-10-CM

## 2025-01-09 DIAGNOSIS — H43.11 VITREOUS HEMORRHAGE OF RIGHT EYE (MULTI): Primary | ICD-10-CM

## 2025-01-09 PROCEDURE — 92134 CPTRZ OPH DX IMG PST SGM RTA: CPT

## 2025-01-09 PROCEDURE — 99214 OFFICE O/P EST MOD 30 MIN: CPT

## 2025-01-09 RX ORDER — HYDROCHLOROTHIAZIDE 50 MG/1
50 TABLET ORAL DAILY
Qty: 90 TABLET | Refills: 3 | Status: SHIPPED | OUTPATIENT
Start: 2025-01-09

## 2025-01-09 ASSESSMENT — VISUAL ACUITY
OS_SC: 20/20
OD_SC: 20/50+2
METHOD: SNELLEN - LINEAR

## 2025-01-09 ASSESSMENT — CONF VISUAL FIELD
OS_SUPERIOR_TEMPORAL_RESTRICTION: 0
OD_NORMAL: 1
OD_SUPERIOR_TEMPORAL_RESTRICTION: 0
OS_NORMAL: 1
OS_SUPERIOR_NASAL_RESTRICTION: 0
OD_INFERIOR_TEMPORAL_RESTRICTION: 0
OD_INFERIOR_NASAL_RESTRICTION: 0
OS_INFERIOR_NASAL_RESTRICTION: 0
OS_INFERIOR_TEMPORAL_RESTRICTION: 0
OD_SUPERIOR_NASAL_RESTRICTION: 0

## 2025-01-09 ASSESSMENT — ENCOUNTER SYMPTOMS: EYES NEGATIVE: 1

## 2025-01-09 ASSESSMENT — TONOMETRY
IOP_METHOD: GOLDMANN APPLANATION
OD_IOP_MMHG: 16
OS_IOP_MMHG: 16

## 2025-01-09 ASSESSMENT — SLIT LAMP EXAM - LIDS
COMMENTS: NORMAL
COMMENTS: NORMAL

## 2025-01-09 ASSESSMENT — EXTERNAL EXAM - RIGHT EYE: OD_EXAM: NORMAL

## 2025-01-09 ASSESSMENT — CUP TO DISC RATIO: OS_RATIO: 0.4

## 2025-01-09 ASSESSMENT — EXTERNAL EXAM - LEFT EYE: OS_EXAM: NORMAL

## 2025-01-09 NOTE — PROGRESS NOTES
Proliferative diabetic retinopathy with macular edema in type II DM, both eyesE11.5544  Vitreous hemorrhage, right eye  - Hx of Diabetes 20 years  - Most recent HbA1c = 8.5 12/17/24  - Hx of HTN  - No Hx of kidney disease    S/p PRP and IVT OU (by [facility] in the past)    - OCT 01/09/25   --- Both eyes (OU): Increased retinal thickness, center involving DME.     #Plan#:   - DME OU (OD>OS), VH OD  - - Emphasized the importance of blood glucose, BP, and cholestrol level control  - Discussed risks/benefits/alteranatives of treatment options. Patient elects to proceed with injections  - Will start injection OD and defer fillin PRP until DME resolves  - Will get PA for JULIA or THUAN  - RTC in 5 weeks  -

## 2025-02-05 ENCOUNTER — APPOINTMENT (OUTPATIENT)
Dept: OPHTHALMOLOGY | Facility: CLINIC | Age: 65
End: 2025-02-05
Payer: COMMERCIAL

## 2025-02-05 DIAGNOSIS — E11.3511 PROLIFERATIVE DIABETIC RETINOPATHY OF RIGHT EYE WITH MACULAR EDEMA ASSOCIATED WITH TYPE 2 DIABETES MELLITUS: Primary | ICD-10-CM

## 2025-02-05 PROCEDURE — 92134 CPTRZ OPH DX IMG PST SGM RTA: CPT

## 2025-02-05 PROCEDURE — 67028 INJECTION EYE DRUG: CPT | Mod: RIGHT SIDE

## 2025-02-05 RX ORDER — CIPROFLOXACIN HYDROCHLORIDE 500 MG/1
1.25 TABLET ORAL
Status: COMPLETED | OUTPATIENT
Start: 2025-02-05 | End: 2025-02-05

## 2025-02-05 RX ADMIN — CIPROFLOXACIN HYDROCHLORIDE 1.25 MG: 500 TABLET ORAL at 13:14

## 2025-02-05 ASSESSMENT — CONF VISUAL FIELD
OS_INFERIOR_TEMPORAL_RESTRICTION: 0
OS_INFERIOR_NASAL_RESTRICTION: 0
OS_NORMAL: 1
OD_SUPERIOR_TEMPORAL_RESTRICTION: 0
OD_NORMAL: 1
OD_SUPERIOR_NASAL_RESTRICTION: 0
OD_INFERIOR_NASAL_RESTRICTION: 0
OD_INFERIOR_TEMPORAL_RESTRICTION: 0
OS_SUPERIOR_TEMPORAL_RESTRICTION: 0
OS_SUPERIOR_NASAL_RESTRICTION: 0

## 2025-02-05 ASSESSMENT — VISUAL ACUITY
OS_PH_SC: 20/20-1
OD_SC: 20/30-1
METHOD: SNELLEN - LINEAR
OS_SC: 20/30+2

## 2025-02-05 ASSESSMENT — SLIT LAMP EXAM - LIDS
COMMENTS: NORMAL
COMMENTS: NORMAL

## 2025-02-05 ASSESSMENT — ENCOUNTER SYMPTOMS
ALLERGIC/IMMUNOLOGIC NEGATIVE: 0
CONSTITUTIONAL NEGATIVE: 0
RESPIRATORY NEGATIVE: 0
EYES NEGATIVE: 1
ENDOCRINE NEGATIVE: 0
MUSCULOSKELETAL NEGATIVE: 0
NEUROLOGICAL NEGATIVE: 0
GASTROINTESTINAL NEGATIVE: 0
HEMATOLOGIC/LYMPHATIC NEGATIVE: 0
PSYCHIATRIC NEGATIVE: 0
CARDIOVASCULAR NEGATIVE: 0

## 2025-02-05 ASSESSMENT — CUP TO DISC RATIO
OD_RATIO: 0.4
OS_RATIO: 0.4

## 2025-02-05 ASSESSMENT — EXTERNAL EXAM - LEFT EYE: OS_EXAM: NORMAL

## 2025-02-05 ASSESSMENT — TONOMETRY
OD_IOP_MMHG: 12
OS_IOP_MMHG: 12
IOP_METHOD: GOLDMANN APPLANATION

## 2025-02-05 ASSESSMENT — EXTERNAL EXAM - RIGHT EYE: OD_EXAM: NORMAL

## 2025-02-05 NOTE — PROGRESS NOTES
Proliferative diabetic retinopathy with macular edema in type II DM, both eyesE11.9042  Vitreous hemorrhage, right eye - resolved 2/5/25  - Hx of Diabetes 20 years  - Most recent HbA1c = 8.5 12/17/24  - Hx of HTN  - No Hx of kidney disease    S/p PRP and IVT OU (by [facility] in the past)    - OCT 02/05/25   --- Both eyes (OU): Increased retinal thickness, center involving DME.     Plan    - DME OU (OD>OS)  - Emphasized the importance of blood glucose, BP, and cholestrol level control  - Discussed risks/benefits/alteranatives of treatment options. Patient elects to proceed with injections. Consent obtained for IV OU 2/5/25 valid thru 12/31/25.  - Consider fill-in PRP OD    Avastin OD #1 today    RTC 4 weeks

## 2025-02-27 ENCOUNTER — TELEPHONE (OUTPATIENT)
Dept: PRIMARY CARE | Facility: CLINIC | Age: 65
End: 2025-02-27
Payer: COMMERCIAL

## 2025-02-27 DIAGNOSIS — E10.36 TYPE 1 DIABETES MELLITUS WITH DIABETIC CATARACT: Primary | ICD-10-CM

## 2025-02-27 DIAGNOSIS — E10.36 TYPE 1 DIABETES MELLITUS WITH DIABETIC CATARACT: ICD-10-CM

## 2025-02-27 RX ORDER — BLOOD-GLUCOSE SENSOR
EACH MISCELLANEOUS
Qty: 9 EACH | Refills: 3 | Status: SHIPPED | OUTPATIENT
Start: 2025-02-27 | End: 2025-02-27 | Stop reason: SDUPTHER

## 2025-02-27 RX ORDER — BLOOD-GLUCOSE SENSOR
EACH MISCELLANEOUS
Qty: 9 EACH | Refills: 3 | Status: SHIPPED | OUTPATIENT
Start: 2025-02-27

## 2025-02-27 NOTE — PROGRESS NOTES
"Patient ID: Anna Santos is a 64 y.o. female who presents for No chief complaint on file..  HPI  ***    ROS  Comprehensive review of systems is negative.    Objective   Physical Exam  There were no vitals taken for this visit.   There were no vitals filed for this visit.   There is no height or weight on file to calculate BMI.      ***    Current Outpatient Medications   Medication Sig Dispense Refill    albuterol 90 mcg/actuation inhaler Inhale 1 puff every 6 hours if needed for wheezing. 18 g 3    aspirin 81 mg EC tablet Take 1 tablet (81 mg) by mouth once daily.      atorvastatin (Lipitor) 80 mg tablet Take 1 tablet (80 mg) by mouth once daily. 90 tablet 3    blood sugar diagnostic (Contour Next Test Strips) strip CHECK BLOOD SUAR 4-5 TIMES PER DAY FOR INSULIN REQUIRING T2DM 100 strip 11    blood-glucose sensor (Dexcom G7 Sensor) device Change every 10 days 9 each 3    budesonide-formoteroL (Symbicort) 160-4.5 mcg/actuation inhaler Inhale 2 puffs 2 times a day.      cholecalciferol (Vitamin D-3) 5,000 Units tablet Take by mouth.      Dexcom G4 platinum  (Dexcom G6 ) misc USE AS DIRECTED 3 each 11    Dexcom G6 Sensor device APPLY TO UPPER ARM AND CHANGE EVERY 10 DAYS      Dexcom G6 Transmitter device USE AS DIRECTED      fluticasone (Flonase) 50 mcg/actuation nasal spray Administer 1 spray into affected nostril(s) 2 times a day.      hydroCHLOROthiazide (HYDRODiuril) 50 mg tablet Take 1 tablet by mouth once daily. 90 tablet 3    insulin regular (HumuLIN R U-500, Conc, Insulin) 500 unit/mL CONCENTRATED injection Inject under the skin once daily in the morning. Take as directed per insulin instructions. Use U-500 insulin syringe.INJECT A TOTAL DAILY DOSE  OF 50 UNITS IN INSULIN PUMP(U500) 60 mL 3    insulin syringe-needle U-100 (BD Insulin Syringe Ultra-Fine) 1 mL 30 gauge x 1/2\" syringe       Jardiance 25 mg Take 1 tablet (25 mg) by mouth once daily. 90 tablet 3    magnesium oxide (Mag-Ox) 400 mg " (241.3 mg magnesium) tablet Take 1 tablet (400 mg) by mouth once daily.      metoprolol tartrate (Lopressor) 25 mg tablet TAKE 1 TABLET TWICE A  tablet 1    metoprolol tartrate (Lopressor) 25 mg tablet Take 1 tablet (25 mg) by mouth 2 times a day. 180 tablet 3    ONETOUCH DELICA LANCETS MISC 4 times a day.      pantoprazole (ProtoNix) 40 mg EC tablet Take 1 tablet (40 mg) by mouth once daily in the morning. Take before meals.      polyethylene glycol (Miralax) 17 gram/dose powder Take 1 Dose by mouth once daily at bedtime.      semaglutide (Ozempic) 1 mg/dose (4 mg/3 mL) pen injector Inject 1 mg under the skin 1 (one) time per week. 9 mL 2    semaglutide 2 mg/dose (8 mg/3 mL) pen injector Inject 2 mg under the skin every 7 days. 9 mL 3    spironolactone (Aldactone) 50 mg tablet Take 1 tablet (50 mg) by mouth 2 times a day. 180 tablet 3    traMADol (Ultram) 50 mg tablet Take 1 tablet (50 mg) by mouth every 4 hours if needed.       No current facility-administered medications for this visit.       Assessment/Plan   ***

## 2025-02-27 NOTE — TELEPHONE ENCOUNTER
Patients pharmacy is calling because the Dexcom 7 that was ordered is on back order. The pharmacy can be reached at  [phone] using reference number [id number], if a replacement will be sent

## 2025-03-05 ENCOUNTER — APPOINTMENT (OUTPATIENT)
Dept: OPHTHALMOLOGY | Facility: CLINIC | Age: 65
End: 2025-03-05
Payer: COMMERCIAL

## 2025-03-05 DIAGNOSIS — E11.3511 PROLIFERATIVE DIABETIC RETINOPATHY OF RIGHT EYE WITH MACULAR EDEMA ASSOCIATED WITH TYPE 2 DIABETES MELLITUS: Primary | ICD-10-CM

## 2025-03-05 PROCEDURE — 1159F MED LIST DOCD IN RCRD: CPT

## 2025-03-05 PROCEDURE — 1036F TOBACCO NON-USER: CPT

## 2025-03-05 PROCEDURE — 92134 CPTRZ OPH DX IMG PST SGM RTA: CPT

## 2025-03-05 PROCEDURE — 67028 INJECTION EYE DRUG: CPT | Mod: RIGHT SIDE

## 2025-03-05 RX ORDER — CIPROFLOXACIN HYDROCHLORIDE 500 MG/1
1.25 TABLET ORAL
Status: COMPLETED | OUTPATIENT
Start: 2025-03-05 | End: 2025-03-05

## 2025-03-05 RX ADMIN — CIPROFLOXACIN HYDROCHLORIDE 1.25 MG: 500 TABLET ORAL at 14:31

## 2025-03-05 ASSESSMENT — ENCOUNTER SYMPTOMS
CARDIOVASCULAR NEGATIVE: 0
MUSCULOSKELETAL NEGATIVE: 0
ALLERGIC/IMMUNOLOGIC NEGATIVE: 0
EYES NEGATIVE: 0
RESPIRATORY NEGATIVE: 0
HEMATOLOGIC/LYMPHATIC NEGATIVE: 0
ENDOCRINE NEGATIVE: 0
NEUROLOGICAL NEGATIVE: 0
CONSTITUTIONAL NEGATIVE: 0
PSYCHIATRIC NEGATIVE: 0
GASTROINTESTINAL NEGATIVE: 0

## 2025-03-05 ASSESSMENT — VISUAL ACUITY
OD_SC: 20/60
OS_PH_SC+: -3
OS_SC+: -2
OD_PH_SC+: -2
OS_PH_SC: 20/20
OD_PH_SC: 20/30
OS_SC: 20/30
METHOD: SNELLEN - LINEAR

## 2025-03-05 ASSESSMENT — SLIT LAMP EXAM - LIDS
COMMENTS: NORMAL
COMMENTS: NORMAL

## 2025-03-05 ASSESSMENT — CUP TO DISC RATIO
OD_RATIO: 0.4
OS_RATIO: 0.4

## 2025-03-05 ASSESSMENT — TONOMETRY
OS_IOP_MMHG: 13
OD_IOP_MMHG: 16
IOP_METHOD: GOLDMANN APPLANATION

## 2025-03-05 ASSESSMENT — EXTERNAL EXAM - RIGHT EYE: OD_EXAM: NORMAL

## 2025-03-05 ASSESSMENT — EXTERNAL EXAM - LEFT EYE: OS_EXAM: NORMAL

## 2025-03-05 NOTE — PROGRESS NOTES
Proliferative diabetic retinopathy with macular edema in type II DM, both eyesE11.7233  Vitreous hemorrhage, right eye - resolved 2/5/25  - Hx of Diabetes 20 years  - Most recent HbA1c = 8.5 12/17/24  - Hx of HTN  - No Hx of kidney disease    S/p PRP and IVT OU (by [facility] in the past)    - OCT 03/05/25   --- Both eyes (OU): Increased retinal thickness, center involving DME.     Plan    - DME OU (OD>OS)  - Emphasized the importance of blood glucose, BP, and cholestrol level control  - Discussed risks/benefits/alteranatives of treatment options. Patient elects to proceed with injections. Consent obtained for IV OU 2/5/25 valid thru 12/31/25.  - Consider fill-in PRP OD    Avastin OD #2 today    RTC 4 weeks

## 2025-03-31 DIAGNOSIS — I10 ACCELERATED HYPERTENSION: ICD-10-CM

## 2025-03-31 DIAGNOSIS — I10 BENIGN ESSENTIAL HYPERTENSION: ICD-10-CM

## 2025-03-31 DIAGNOSIS — E78.2 MIXED HYPERLIPIDEMIA: ICD-10-CM

## 2025-03-31 DIAGNOSIS — N18.32 TYPE 2 DIABETES MELLITUS WITH STAGE 3B CHRONIC KIDNEY DISEASE, WITH LONG-TERM CURRENT USE OF INSULIN (MULTI): ICD-10-CM

## 2025-03-31 DIAGNOSIS — E11.22 TYPE 2 DIABETES MELLITUS WITH STAGE 3B CHRONIC KIDNEY DISEASE, WITH LONG-TERM CURRENT USE OF INSULIN (MULTI): ICD-10-CM

## 2025-03-31 DIAGNOSIS — Z79.4 TYPE 2 DIABETES MELLITUS WITH STAGE 3B CHRONIC KIDNEY DISEASE, WITH LONG-TERM CURRENT USE OF INSULIN (MULTI): ICD-10-CM

## 2025-03-31 RX ORDER — METOPROLOL TARTRATE 25 MG/1
25 TABLET, FILM COATED ORAL 2 TIMES DAILY
Qty: 180 TABLET | Refills: 3 | Status: SHIPPED | OUTPATIENT
Start: 2025-03-31

## 2025-03-31 RX ORDER — EMPAGLIFLOZIN 25 MG/1
25 TABLET, FILM COATED ORAL DAILY
Qty: 90 TABLET | Refills: 3 | Status: SHIPPED | OUTPATIENT
Start: 2025-03-31

## 2025-03-31 RX ORDER — ATORVASTATIN CALCIUM 80 MG/1
80 TABLET, FILM COATED ORAL DAILY
Qty: 90 TABLET | Refills: 3 | Status: SHIPPED | OUTPATIENT
Start: 2025-03-31

## 2025-03-31 RX ORDER — SPIRONOLACTONE 50 MG/1
50 TABLET, FILM COATED ORAL 2 TIMES DAILY
Qty: 180 TABLET | Refills: 3 | Status: SHIPPED | OUTPATIENT
Start: 2025-03-31

## 2025-04-07 ENCOUNTER — APPOINTMENT (OUTPATIENT)
Dept: OPHTHALMOLOGY | Facility: CLINIC | Age: 65
End: 2025-04-07
Payer: COMMERCIAL

## 2025-04-07 DIAGNOSIS — E11.3511 PROLIFERATIVE DIABETIC RETINOPATHY OF RIGHT EYE WITH MACULAR EDEMA ASSOCIATED WITH TYPE 2 DIABETES MELLITUS: Primary | ICD-10-CM

## 2025-04-07 PROCEDURE — 92134 CPTRZ OPH DX IMG PST SGM RTA: CPT

## 2025-04-07 PROCEDURE — 67028 INJECTION EYE DRUG: CPT | Mod: RIGHT SIDE

## 2025-04-07 RX ORDER — CIPROFLOXACIN HYDROCHLORIDE 500 MG/1
1.25 TABLET ORAL
Status: COMPLETED | OUTPATIENT
Start: 2025-04-07 | End: 2025-04-07

## 2025-04-07 RX ADMIN — CIPROFLOXACIN HYDROCHLORIDE 1.25 MG: 500 TABLET ORAL at 16:51

## 2025-04-07 ASSESSMENT — CUP TO DISC RATIO
OD_RATIO: 0.4
OS_RATIO: 0.4

## 2025-04-07 ASSESSMENT — ENCOUNTER SYMPTOMS
ENDOCRINE NEGATIVE: 0
PSYCHIATRIC NEGATIVE: 0
HEMATOLOGIC/LYMPHATIC NEGATIVE: 0
MUSCULOSKELETAL NEGATIVE: 0
ALLERGIC/IMMUNOLOGIC NEGATIVE: 0
NEUROLOGICAL NEGATIVE: 0
GASTROINTESTINAL NEGATIVE: 0
CONSTITUTIONAL NEGATIVE: 0
EYES NEGATIVE: 0
RESPIRATORY NEGATIVE: 0
CARDIOVASCULAR NEGATIVE: 0

## 2025-04-07 ASSESSMENT — EXTERNAL EXAM - RIGHT EYE: OD_EXAM: NORMAL

## 2025-04-07 ASSESSMENT — TONOMETRY
OD_IOP_MMHG: 14
OS_IOP_MMHG: 12
IOP_METHOD: GOLDMANN APPLANATION

## 2025-04-07 ASSESSMENT — VISUAL ACUITY
OS_SC: 20/20
OD_PH_SC+: -2
OD_SC: 20/40
OD_PH_SC: 20/25
METHOD: SNELLEN - LINEAR

## 2025-04-07 ASSESSMENT — SLIT LAMP EXAM - LIDS
COMMENTS: NORMAL
COMMENTS: NORMAL

## 2025-04-07 ASSESSMENT — EXTERNAL EXAM - LEFT EYE: OS_EXAM: NORMAL

## 2025-04-07 NOTE — PROGRESS NOTES
Proliferative diabetic retinopathy with macular edema in type II DM, both eyesE11.7743  Vitreous hemorrhage, right eye - resolved 2/5/25  - Hx of Diabetes 20 years  - Most recent HbA1c = 8.5 12/17/24  - Hx of HTN  - No Hx of kidney disease    S/p PRP and IVT OU (by [facility] in the past)    - OCT 04/07/25   --- Both eyes (OU): Increased retinal thickness, center involving DME.     Plan    - DME OU (OD>OS); improved s/p JULIA OD  - Emphasized the importance of blood glucose, BP, and cholestrol level control  - Discussed risks/benefits/alteranatives of treatment options. Patient elects to proceed with injections. Consent obtained for IV OU 2/5/25 valid thru 12/31/25.  - VH inferiorly, with incomplete laser PRP; Consider fill-in PRP OD after DME stability   - JULIA#2 OD done   - RTC 4 weeks

## 2025-04-15 DIAGNOSIS — E10.36 TYPE 1 DIABETES MELLITUS WITH DIABETIC CATARACT: ICD-10-CM

## 2025-04-15 RX ORDER — BLOOD-GLUCOSE SENSOR
EACH MISCELLANEOUS
Qty: 9 EACH | Refills: 3 | Status: SHIPPED | OUTPATIENT
Start: 2025-04-15

## 2025-05-08 ENCOUNTER — APPOINTMENT (OUTPATIENT)
Dept: OPHTHALMOLOGY | Facility: CLINIC | Age: 65
End: 2025-05-08
Payer: COMMERCIAL

## 2025-05-08 DIAGNOSIS — E11.3511 PROLIFERATIVE DIABETIC RETINOPATHY OF RIGHT EYE WITH MACULAR EDEMA ASSOCIATED WITH TYPE 2 DIABETES MELLITUS: ICD-10-CM

## 2025-05-08 DIAGNOSIS — E11.3513 PROLIFERATIVE DIABETIC RETINOPATHY OF BOTH EYES WITH MACULAR EDEMA ASSOCIATED WITH TYPE 2 DIABETES MELLITUS: Primary | ICD-10-CM

## 2025-05-08 PROCEDURE — 67028 INJECTION EYE DRUG: CPT | Mod: RIGHT SIDE

## 2025-05-08 PROCEDURE — 92134 CPTRZ OPH DX IMG PST SGM RTA: CPT

## 2025-05-08 RX ADMIN — CIPROFLOXACIN HYDROCHLORIDE 1.25 MG: 500 TABLET ORAL at 13:45

## 2025-05-08 ASSESSMENT — VISUAL ACUITY
METHOD: SNELLEN - LINEAR
OS_SC: 20/20
OD_SC: 2025-1

## 2025-05-08 ASSESSMENT — EXTERNAL EXAM - LEFT EYE: OS_EXAM: NORMAL

## 2025-05-08 ASSESSMENT — TONOMETRY
IOP_METHOD: GOLDMANN APPLANATION
OD_IOP_MMHG: 14
OS_IOP_MMHG: 12

## 2025-05-08 ASSESSMENT — SLIT LAMP EXAM - LIDS
COMMENTS: NORMAL
COMMENTS: NORMAL

## 2025-05-08 ASSESSMENT — EXTERNAL EXAM - RIGHT EYE: OD_EXAM: NORMAL

## 2025-05-08 ASSESSMENT — CUP TO DISC RATIO
OS_RATIO: 0.4
OD_RATIO: 0.4

## 2025-05-08 NOTE — PROGRESS NOTES
Proliferative diabetic retinopathy with macular edema in type II DM, both eyesE11.2983  Vitreous hemorrhage, right eye - resolved 2/5/25  - Hx of Diabetes 20 years  - Most recent HbA1c = 8.5 12/17/24  - Hx of HTN  - No Hx of kidney disease    S/p PRP and IVT OU (by [facility] in the past)    - OCT 05/08/25   --- Both eyes (OU): Increased retinal thickness, center involving DME.     Plan    - DME OU (OD>OS)  - Emphasized the importance of blood glucose, BP, and cholestrol level control  - Discussed risks/benefits/alteranatives of treatment options. Patient elects to proceed with injections. Consent obtained for IV OU 2/5/25 valid thru 12/31/25.  - VH inferiorly, with incomplete laser PRP; Consider fill-in PRP OD after DME stability   - JULIA#4 OD done - no significant improvement with it.   - Will get PA for IVV (preferred) or THUAN  - RTC 4 weeks

## 2025-05-27 RX ORDER — CIPROFLOXACIN HYDROCHLORIDE 500 MG/1
1.25 TABLET ORAL
Status: COMPLETED | OUTPATIENT
Start: 2025-05-08 | End: 2025-05-08

## 2025-06-12 ENCOUNTER — APPOINTMENT (OUTPATIENT)
Dept: OPHTHALMOLOGY | Facility: CLINIC | Age: 65
End: 2025-06-12
Payer: COMMERCIAL

## 2025-06-12 DIAGNOSIS — E10.36 TYPE 1 DIABETES MELLITUS WITH DIABETIC CATARACT: Primary | ICD-10-CM

## 2025-06-12 DIAGNOSIS — E11.3513 PROLIFERATIVE DIABETIC RETINOPATHY OF BOTH EYES WITH MACULAR EDEMA ASSOCIATED WITH TYPE 2 DIABETES MELLITUS: ICD-10-CM

## 2025-06-12 PROCEDURE — 99213 OFFICE O/P EST LOW 20 MIN: CPT

## 2025-06-12 PROCEDURE — 92134 CPTRZ OPH DX IMG PST SGM RTA: CPT

## 2025-06-12 ASSESSMENT — VISUAL ACUITY
OD_PH_SC: 20/20
OS_SC: 20/20
METHOD: SNELLEN - LINEAR
OD_PH_SC+: - 2
OD_SC: 20/30

## 2025-06-12 ASSESSMENT — TONOMETRY
OS_IOP_MMHG: 12
OD_IOP_MMHG: 13
IOP_METHOD: GOLDMANN APPLANATION

## 2025-06-12 ASSESSMENT — CUP TO DISC RATIO
OS_RATIO: 0.4
OD_RATIO: 0.4

## 2025-06-12 ASSESSMENT — SLIT LAMP EXAM - LIDS
COMMENTS: NORMAL
COMMENTS: NORMAL

## 2025-06-12 ASSESSMENT — EXTERNAL EXAM - RIGHT EYE: OD_EXAM: NORMAL

## 2025-06-12 ASSESSMENT — EXTERNAL EXAM - LEFT EYE: OS_EXAM: NORMAL

## 2025-06-12 ASSESSMENT — ENCOUNTER SYMPTOMS: EYES NEGATIVE: 1

## 2025-06-12 NOTE — PROGRESS NOTES
Proliferative diabetic retinopathy with macular edema in type II DM, both eyesE11.3513  Vitreous hemorrhage, right eye - resolved 2/5/25  - Hx of Diabetes 20 years  - Most recent HbA1c = 8.5 12/17/24  - Hx of HTN  - No Hx of kidney disease    S/p PRP and IVT OU (by [facility] in the past)    - OCT 06/12/25   --- Both eyes (OU): Increased retinal thickness, center involving DME.     Plan    - DME OU (OD>OS)  - Emphasized the importance of blood glucose, BP, and cholestrol level control  - Discussed risks/benefits/alteranatives of treatment options. Patient elects to proceed with injections. Consent obtained for IV OU 2/5/25 valid thru 12/31/25.  - VH inferiorly, with incomplete laser PRP; Consider fill-in PRP OD after DME stability   - JULIA#4 OD done - no significant improvement with it.   - Will get PA for IVV (preferred) or THUAN  - No improvement of DME on JULIA #4 - VA is 20/20 (vision at one visit was 20/40; will watch for change in vision without injection before committing to IVV or THUAN)  - RTC 6 weeks

## 2025-06-17 ENCOUNTER — APPOINTMENT (OUTPATIENT)
Dept: ENDOCRINOLOGY | Facility: CLINIC | Age: 65
End: 2025-06-17
Payer: COMMERCIAL

## 2025-06-17 VITALS — SYSTOLIC BLOOD PRESSURE: 136 MMHG | BODY MASS INDEX: 32.78 KG/M2 | DIASTOLIC BLOOD PRESSURE: 82 MMHG | WEIGHT: 235 LBS

## 2025-06-17 DIAGNOSIS — N18.32 TYPE 2 DIABETES MELLITUS WITH STAGE 3B CHRONIC KIDNEY DISEASE, WITH LONG-TERM CURRENT USE OF INSULIN (MULTI): Primary | ICD-10-CM

## 2025-06-17 DIAGNOSIS — E11.22 TYPE 2 DIABETES MELLITUS WITH STAGE 3B CHRONIC KIDNEY DISEASE, WITH LONG-TERM CURRENT USE OF INSULIN (MULTI): Primary | ICD-10-CM

## 2025-06-17 DIAGNOSIS — E10.36 TYPE 1 DIABETES MELLITUS WITH DIABETIC CATARACT: ICD-10-CM

## 2025-06-17 DIAGNOSIS — E78.2 MIXED HYPERLIPIDEMIA: ICD-10-CM

## 2025-06-17 DIAGNOSIS — Z79.4 TYPE 2 DIABETES MELLITUS WITH STAGE 3B CHRONIC KIDNEY DISEASE, WITH LONG-TERM CURRENT USE OF INSULIN (MULTI): Primary | ICD-10-CM

## 2025-06-17 DIAGNOSIS — I10 BENIGN ESSENTIAL HYPERTENSION: ICD-10-CM

## 2025-06-17 LAB — POC HEMOGLOBIN A1C: 7.7 % (ref 4.2–7)

## 2025-06-17 PROCEDURE — 83036 HEMOGLOBIN GLYCOSYLATED A1C: CPT | Performed by: INTERNAL MEDICINE

## 2025-06-17 PROCEDURE — 99214 OFFICE O/P EST MOD 30 MIN: CPT | Performed by: INTERNAL MEDICINE

## 2025-06-17 PROCEDURE — 3079F DIAST BP 80-89 MM HG: CPT | Performed by: INTERNAL MEDICINE

## 2025-06-17 PROCEDURE — 3051F HG A1C>EQUAL 7.0%<8.0%: CPT | Performed by: INTERNAL MEDICINE

## 2025-06-17 PROCEDURE — 3075F SYST BP GE 130 - 139MM HG: CPT | Performed by: INTERNAL MEDICINE

## 2025-06-17 RX ORDER — BLOOD-GLUCOSE SENSOR
EACH MISCELLANEOUS
Qty: 9 EACH | Refills: 3 | Status: SHIPPED | OUTPATIENT
Start: 2025-06-17

## 2025-06-17 RX ORDER — INSULIN HUMAN 500 [IU]/ML
INJECTION, SOLUTION SUBCUTANEOUS EVERY MORNING
Qty: 60 ML | Refills: 3 | Status: SHIPPED | OUTPATIENT
Start: 2025-06-17

## 2025-06-17 RX ORDER — DAPAGLIFLOZIN 10 MG/1
10 TABLET, FILM COATED ORAL DAILY
Qty: 90 TABLET | Refills: 3 | Status: SHIPPED | OUTPATIENT
Start: 2025-06-17 | End: 2026-06-17

## 2025-06-17 NOTE — PROGRESS NOTES
Patient ID: Anna Santos is a 64 y.o. female who presents for Follow-up (DM follow up).  HPI  The patient comes in for follow up.    She has type 2 diabetes complicated by retinopathy nephropathy neuropathy hypertension hyperlipidemia.    She continues on U-500 insulin and the tandem control IQ pump using Dexcom 7.    She is on Ozempic 1 mg Jardiance 25 mg.    She was supposed increased to 2 mg Ozempic but did not and has not been getting the Jardiance.    She was occasional bloating and constipation but it is not related to the Ozempic timing.  She added in a probiotic and it has resolved.    Her sugars have been under good control although she has noted that about 2 hours postmeal her blood sugars are going up.    Physically she has no other complaints.    ROS  Comprehensive review of systems is negative.    Objective   Physical Exam  Visit Vitals  /82      Vitals:    06/17/25 0826   Weight: 107 kg (235 lb)      Body mass index is 32.78 kg/m².      Weight 235 up 1 pound    ENT normal. No adenopathy  Fundi normal  Thyroid palpable and normal. No nodules  Chest clear to auscultation  Heart sounds are normal  Abdomen nontender. Bowel sounds normal. No organomegaly  Feet are okay  Normal vibration and monofilament sensation normal pulses, no lesions    Current Medications[1]    Assessment/Plan     1.  Type 2 diabetes  2.  Hypertension  3.  Hyperlipidemia  4.  Renal insufficiency    Will check hemoglobin A1c by fingerstick today.    Will increase the Ozempic to 2 mg.    Will try Farxiga in place of the Jardiance.    She will increase her bolus and if she is getting low blood sugars at 2 hours try an extended/square bolus.    Encouraged her to follow-up with nephrology.    She will follow-up with me in 4 months sooner as needed.                 [1]   Current Outpatient Medications   Medication Sig Dispense Refill    albuterol 90 mcg/actuation inhaler Inhale 1 puff every 6 hours if needed for wheezing. 18 g 3     "aspirin 81 mg EC tablet Take 1 tablet (81 mg) by mouth once daily.      atorvastatin (Lipitor) 80 mg tablet Take 1 tablet by mouth once daily. 90 tablet 3    blood sugar diagnostic (Contour Next Test Strips) strip CHECK BLOOD SUAR 4-5 TIMES PER DAY FOR INSULIN REQUIRING T2DM 100 strip 11    blood-glucose sensor (Dexcom G7 Sensor) device Change every 10 days 9 each 3    blood-glucose sensor (Dexcom G7 Sensor) device Change every 10 days 9 each 3    budesonide-formoteroL (Symbicort) 160-4.5 mcg/actuation inhaler Inhale 2 puffs 2 times a day.      cholecalciferol (Vitamin D-3) 5,000 Units tablet Take by mouth.      Dexcom G6 Transmitter device USE AS DIRECTED      fluticasone (Flonase) 50 mcg/actuation nasal spray Administer 1 spray into affected nostril(s) 2 times a day.      hydroCHLOROthiazide (HYDRODiuril) 50 mg tablet Take 1 tablet by mouth once daily. 90 tablet 3    insulin regular (HumuLIN R U-500, Conc, Insulin) 500 unit/mL CONCENTRATED injection Inject under the skin once daily in the morning. Take as directed per insulin instructions. Use U-500 insulin syringe.INJECT A TOTAL DAILY DOSE  OF 50 UNITS IN INSULIN PUMP(U500) 60 mL 3    insulin syringe-needle U-100 (BD Insulin Syringe Ultra-Fine) 1 mL 30 gauge x 1/2\" syringe       Jardiance 25 mg tablet Take 1 tablet by mouth once daily. 90 tablet 3    magnesium oxide (Mag-Ox) 400 mg (241.3 mg magnesium) tablet Take 1 tablet by mouth once daily.      metoprolol tartrate (Lopressor) 25 mg tablet TAKE 1 TABLET TWICE A  tablet 1    metoprolol tartrate (Lopressor) 25 mg tablet Take 1 tablet by mouth twice daily. 180 tablet 3    ONETOUCH DELICA LANCETS MISC 4 times a day.      pantoprazole (ProtoNix) 40 mg EC tablet Take 1 tablet (40 mg) by mouth once daily in the morning. Take before meals.      polyethylene glycol (Miralax) 17 gram/dose powder Take 1 Dose by mouth once daily at bedtime.      semaglutide 2 mg/dose (8 mg/3 mL) pen injector Inject 2 mg under the " skin every 7 days. 9 mL 3    spironolactone (Aldactone) 50 mg tablet Take 1 tablet by mouth twice daily. 180 tablet 3    traMADol (Ultram) 50 mg tablet Take 1 tablet (50 mg) by mouth every 4 hours if needed.      white petrolatum-mineral oiL (Tears Naturale PM) 94-3 % ophthalmic ointment Apply 1 Application to both eyes. Systane x 1 daily OU       No current facility-administered medications for this visit.

## 2025-06-26 ENCOUNTER — APPOINTMENT (OUTPATIENT)
Dept: PRIMARY CARE | Facility: CLINIC | Age: 65
End: 2025-06-26
Payer: COMMERCIAL

## 2025-06-26 VITALS
TEMPERATURE: 96.6 F | SYSTOLIC BLOOD PRESSURE: 134 MMHG | WEIGHT: 228.4 LBS | DIASTOLIC BLOOD PRESSURE: 80 MMHG | HEART RATE: 68 BPM | BODY MASS INDEX: 31.86 KG/M2

## 2025-06-26 DIAGNOSIS — R93.1 AGATSTON CORONARY ARTERY CALCIUM SCORE BETWEEN 200 AND 399: ICD-10-CM

## 2025-06-26 DIAGNOSIS — N25.81 SECONDARY HYPERPARATHYROIDISM OF RENAL ORIGIN (MULTI): ICD-10-CM

## 2025-06-26 DIAGNOSIS — Z78.0 MENOPAUSE: Primary | ICD-10-CM

## 2025-06-26 DIAGNOSIS — R25.1 TREMOR: ICD-10-CM

## 2025-06-26 DIAGNOSIS — J45.20 MILD INTERMITTENT ASTHMA, UNSPECIFIED WHETHER COMPLICATED (HHS-HCC): ICD-10-CM

## 2025-06-26 DIAGNOSIS — E21.3 HYPERPARATHYROIDISM, UNSPECIFIED (MULTI): ICD-10-CM

## 2025-06-26 DIAGNOSIS — E78.2 MIXED HYPERLIPIDEMIA: ICD-10-CM

## 2025-06-26 DIAGNOSIS — Z00.00 HEALTH CARE MAINTENANCE: ICD-10-CM

## 2025-06-26 DIAGNOSIS — I10 BENIGN ESSENTIAL HYPERTENSION: ICD-10-CM

## 2025-06-26 PROCEDURE — 99214 OFFICE O/P EST MOD 30 MIN: CPT | Performed by: INTERNAL MEDICINE

## 2025-06-26 PROCEDURE — 1036F TOBACCO NON-USER: CPT | Performed by: INTERNAL MEDICINE

## 2025-06-26 PROCEDURE — 3079F DIAST BP 80-89 MM HG: CPT | Performed by: INTERNAL MEDICINE

## 2025-06-26 PROCEDURE — 3051F HG A1C>EQUAL 7.0%<8.0%: CPT | Performed by: INTERNAL MEDICINE

## 2025-06-26 PROCEDURE — 1159F MED LIST DOCD IN RCRD: CPT | Performed by: INTERNAL MEDICINE

## 2025-06-26 PROCEDURE — 3075F SYST BP GE 130 - 139MM HG: CPT | Performed by: INTERNAL MEDICINE

## 2025-06-26 PROCEDURE — 1160F RVW MEDS BY RX/DR IN RCRD: CPT | Performed by: INTERNAL MEDICINE

## 2025-06-26 SDOH — HEALTH STABILITY: PHYSICAL HEALTH: ON AVERAGE, HOW MANY DAYS PER WEEK DO YOU ENGAGE IN MODERATE TO STRENUOUS EXERCISE (LIKE A BRISK WALK)?: 0 DAYS

## 2025-06-26 SDOH — HEALTH STABILITY: PHYSICAL HEALTH: ON AVERAGE, HOW MANY MINUTES DO YOU ENGAGE IN EXERCISE AT THIS LEVEL?: 0 MIN

## 2025-06-26 ASSESSMENT — PATIENT HEALTH QUESTIONNAIRE - PHQ9
1. LITTLE INTEREST OR PLEASURE IN DOING THINGS: NOT AT ALL
SUM OF ALL RESPONSES TO PHQ9 QUESTIONS 1 AND 2: 0
2. FEELING DOWN, DEPRESSED OR HOPELESS: NOT AT ALL

## 2025-06-26 ASSESSMENT — LIFESTYLE VARIABLES
SKIP TO QUESTIONS 9-10: 1
HOW OFTEN DO YOU HAVE A DRINK CONTAINING ALCOHOL: NEVER
HOW MANY STANDARD DRINKS CONTAINING ALCOHOL DO YOU HAVE ON A TYPICAL DAY: PATIENT DOES NOT DRINK
HOW OFTEN DO YOU HAVE SIX OR MORE DRINKS ON ONE OCCASION: NEVER
AUDIT-C TOTAL SCORE: 0

## 2025-06-26 NOTE — PROGRESS NOTES
Subjective   Patient ID: Anna Santos is a 64 y.o. female who presents for hand tremor.  History of Present Illness  The patient presents for evaluation of elevated cardiac score, hypertension, hyperlipidemia, allergic rhinitis, diabetes, chronic renal insufficiency, kidney cyst, degenerative joint disease, and health maintenance.    She has been prescribed Farxiga by Dr. Dobbins as a replacement for Jardiance, which she was previously taking. She has not yet started the Farxiga. Her current medication regimen includes Ozempic 2 mg, atorvastatin, metoprolol, insulin via a pump, daily aspirin, spironolactone, hydrochlorothiazide, vitamin D, magnesium supplements, and a recently added probiotic. She does not take pantoprazole. She uses Symbicort inhaler only when sick.    She reports that her blood glucose levels are generally well-controlled, although they tend to spike approximately 2 hours postprandially. This issue was discussed with Dr. Dobbins last week, who subsequently adjusted her bolus insulin dosage. She also reported experiencing hypoglycemic episodes at night, but these have decreased in frequency over the past week.    She has a persistent tremor in her left hand, which has been present for about 3 to 6 months. The tremor is noticeable when she attempts to hold objects. She reports no progression of the tremor, noting that it has remained consistent but persistent.    She is due for a mammogram in 09/2025 and has never undergone a bone density study. She is under the care of a gynecologist, Dr. Mason. Her last colonoscopy was performed in 2019, and she is due for another in 2029. She has had recent appointments with an ophthalmologist and a dentist. She maintains an active lifestyle through regular walking and reports no recent falls. She reports no symptoms of depression, headaches, or dizziness. She also reports no chest pain or shortness of breath. She has been experiencing gastrointestinal  discomfort, which has been alleviated by the probiotic. She reports no other pain. She experiences constipation, which is managed with MiraLAX and the probiotic.    She has sinus issues and allergies but does not take any medication for them. She experiences more congestion and drainage.    SOCIAL HISTORY  She does not drink alcohol. She drinks too much soda and iced tea every day.      PMHx, FHx, Social Hx, Surg Hx personally reviewed at this appointment. No pertinent findings and/or changes from prior (if applicable).    ROS: Unless specified above, pt denies wt gain/loss f/c HA LoC CP SOB NVDC. See HPI above, and scanned sheet (if applicable). All other systems are reviewed and are without complaint.     Objective     /80   Pulse 68   Temp 35.9 °C (96.6 °F) (Temporal)   Wt 104 kg (228 lb 6.4 oz)   LMP  (LMP Unknown)   BMI 31.86 kg/m²      Physical Exam  General Appearance: Normal appearance.  Pulmonary: Clear to auscultation, no wheezing, rales or rhonchi.  Cardiovascular: Regular rate and rhythm, no murmurs, rubs, or gallops.  Abdominal: Soft, no tenderness, no distention, no masses.  Musculoskeletal: No edema.  Neurological: Tremor present in the left hand.      Lab Results   Component Value Date    WBC 6.1 01/26/2024    HGB 13.4 01/26/2024    HCT 43.9 01/26/2024     01/26/2024    CHOL 168 01/26/2024    TRIG 163 (H) 01/26/2024    HDL 41.5 01/26/2024    ALT 42 12/17/2024    AST 39 01/26/2024     (L) 12/17/2024    K 4.6 12/17/2024    CL 95 (L) 12/17/2024    CREATININE 1.89 (H) 12/17/2024    BUN 34 (H) 12/17/2024    CO2 29 12/17/2024    TSH 0.64 12/17/2024    INR 1.2 (H) 01/01/2019    HGBA1C 7.7 (A) 06/17/2025     par     Current Outpatient Medications   Medication Instructions    albuterol 90 mcg/actuation inhaler 1 puff, inhalation, Every 6 hours PRN    aspirin 81 mg, Daily RT    atorvastatin (LIPITOR) 80 mg, oral, Daily    blood sugar diagnostic (Contour Next Test Strips) strip CHECK  "BLOOD SUAR 4-5 TIMES PER DAY FOR INSULIN REQUIRING T2DM    blood-glucose sensor (Dexcom G7 Sensor) device Change every 10 days    blood-glucose sensor (Dexcom G7 Sensor) device Change every 10 days    cholecalciferol (Vitamin D-3) 5,000 Units tablet Take by mouth.    dapagliflozin propanediol (FARXIGA) 10 mg, oral, Daily    Dexcom G6 Transmitter device USE AS DIRECTED    Dexcom G7 Sensor device Change every 10 days    fluticasone (Flonase) 50 mcg/actuation nasal spray 1 spray, 2 times daily    hydroCHLOROthiazide (HYDRODIURIL) 50 mg, oral, Daily    insulin regular (HumuLIN R U-500, Conc, Insulin) 500 unit/mL CONCENTRATED injection subcutaneous, Every morning, Take as directed per insulin instructions. Use U-500 insulin syringe.INJECT A TOTAL DAILY DOSE  OF 50 UNITS IN INSULIN PUMP(U500)    insulin syringe-needle U-100 (BD Insulin Syringe Ultra-Fine) 1 mL 30 gauge x 1/2\" syringe No dose, route, or frequency recorded.    Jardiance 25 mg, oral, Daily    magnesium oxide (Mag-Ox) 400 mg (241.3 mg magnesium) tablet 1 tablet, Daily    metoprolol tartrate (Lopressor) 25 mg tablet TAKE 1 TABLET TWICE A DAY    metoprolol tartrate (LOPRESSOR) 25 mg, oral, 2 times daily    ONETOUCH DELICA LANCETS MISC 4 times daily    polyethylene glycol (Miralax) 17 gram/dose powder 1 Dose, Nightly    semaglutide 2 mg, subcutaneous, Every 7 days    spironolactone (ALDACTONE) 50 mg, oral, 2 times daily    traMADol (ULTRAM) 50 mg, Every 4 hours PRN    white petrolatum-mineral oiL (Tears Naturale PM) 94-3 % ophthalmic ointment 1 Application, Both Eyes, Systane x 1 daily OU        OCT, Retina - OU - Both Eyes  Right Eye  Findings include diabetic retinopathy.     Left Eye  Findings include diabetic retinopathy.     Notes  Retinal multimodal imaging including photography was completed, and the   findings are described in the examination.           Assessment & Plan  1. Elevated cardiac score.  - Risk factors will be modified.    2. Hypertension.  - " Stable on present medications.    3. Hyperlipidemia.  - Fasting lipid profile will be checked.    4. Allergic rhinitis.  - Claritin will be taken as needed.    5. Diabetes.  - Hemoglobin A1c was 7.7.  - Ophthalmology appointment has been completed.  - Follow-up with endocrinology recommended.    6. Chronic renal insufficiency.  - Creatinine will be rechecked.    7. Kidney cyst.  - Follow-up with urology.    8. Degenerative joint disease.  - Symptoms are stable.    9. Health maintenance.  - Refuses immunizations.  - Mammogram scheduled for later this year.  - Colonoscopy completed.  - Eye and dental appointments completed.  - Bone density study will be scheduled.    10. Obesity.  - Encouraged to diet and exercise.    11. Persistent tremor in left hand.  - Referral to a neurologist to rule out conditions such as Parkinson's disease.    12.  Asthma  - Stable symptoms off of medication.      Albert Hodgson MD       This medical note was created with the assistance of artificial intelligence (AI) for documentation purposes. The content has been reviewed and confirmed by the healthcare provider for accuracy and completeness. Patient consented to the use of audio recording and use of AI during their visit.

## 2025-06-26 NOTE — PATIENT INSTRUCTIONS
Please take medication as prescribed.  Schedule complete physical exam.  Schedule your bone density.  Obtain fasting blood work and urine.  You will be notified about a neurology appointment

## 2025-06-27 LAB
25(OH)D3+25(OH)D2 SERPL-MCNC: 74 NG/ML (ref 30–100)
ALBUMIN SERPL-MCNC: 4.4 G/DL (ref 3.6–5.1)
ALP SERPL-CCNC: 61 U/L (ref 37–153)
ALT SERPL-CCNC: 46 U/L (ref 6–29)
ANION GAP SERPL CALCULATED.4IONS-SCNC: 12 MMOL/L (CALC) (ref 7–17)
APPEARANCE UR: CLEAR
AST SERPL-CCNC: 32 U/L (ref 10–35)
BASOPHILS # BLD AUTO: 52 CELLS/UL (ref 0–200)
BASOPHILS NFR BLD AUTO: 0.7 %
BILIRUB SERPL-MCNC: 0.5 MG/DL (ref 0.2–1.2)
BILIRUB UR QL STRIP: NEGATIVE
BUN SERPL-MCNC: 42 MG/DL (ref 7–25)
CALCIUM SERPL-MCNC: 10.6 MG/DL (ref 8.6–10.4)
CHLORIDE SERPL-SCNC: 98 MMOL/L (ref 98–110)
CHOLEST SERPL-MCNC: 113 MG/DL
CHOLEST/HDLC SERPL: 3.4 (CALC)
CO2 SERPL-SCNC: 23 MMOL/L (ref 20–32)
COLOR UR: YELLOW
CREAT SERPL-MCNC: 1.99 MG/DL (ref 0.5–1.05)
EGFRCR SERPLBLD CKD-EPI 2021: 27 ML/MIN/1.73M2
EOSINOPHIL # BLD AUTO: 252 CELLS/UL (ref 15–500)
EOSINOPHIL NFR BLD AUTO: 3.4 %
ERYTHROCYTE [DISTWIDTH] IN BLOOD BY AUTOMATED COUNT: 15.4 % (ref 11–15)
GLUCOSE SERPL-MCNC: 165 MG/DL (ref 65–99)
GLUCOSE UR QL STRIP: ABNORMAL
HCT VFR BLD AUTO: 46.3 % (ref 35–45)
HDLC SERPL-MCNC: 33 MG/DL
HGB BLD-MCNC: 14.5 G/DL (ref 11.7–15.5)
HGB UR QL STRIP: NEGATIVE
KETONES UR QL STRIP: NEGATIVE
LDLC SERPL CALC-MCNC: 57 MG/DL (CALC)
LEUKOCYTE ESTERASE UR QL STRIP: NEGATIVE
LYMPHOCYTES # BLD AUTO: 2116 CELLS/UL (ref 850–3900)
LYMPHOCYTES NFR BLD AUTO: 28.6 %
MCH RBC QN AUTO: 24.9 PG (ref 27–33)
MCHC RBC AUTO-ENTMCNC: 31.3 G/DL (ref 32–36)
MCV RBC AUTO: 79.4 FL (ref 80–100)
MONOCYTES # BLD AUTO: 599 CELLS/UL (ref 200–950)
MONOCYTES NFR BLD AUTO: 8.1 %
NEUTROPHILS # BLD AUTO: 4381 CELLS/UL (ref 1500–7800)
NEUTROPHILS NFR BLD AUTO: 59.2 %
NITRITE UR QL STRIP: NEGATIVE
NONHDLC SERPL-MCNC: 80 MG/DL (CALC)
PH UR STRIP: 6 [PH] (ref 5–8)
PLATELET # BLD AUTO: 198 THOUSAND/UL (ref 140–400)
PMV BLD REES-ECKER: 12.5 FL (ref 7.5–12.5)
POTASSIUM SERPL-SCNC: 4.5 MMOL/L (ref 3.5–5.3)
PROT SERPL-MCNC: 7.8 G/DL (ref 6.1–8.1)
PROT UR QL STRIP: NEGATIVE
RBC # BLD AUTO: 5.83 MILLION/UL (ref 3.8–5.1)
SODIUM SERPL-SCNC: 133 MMOL/L (ref 135–146)
SP GR UR STRIP: 1.02 (ref 1–1.03)
TRIGL SERPL-MCNC: 150 MG/DL
URATE SERPL-MCNC: 10.6 MG/DL (ref 2.5–7)
VIT B12 SERPL-MCNC: 512 PG/ML (ref 200–1100)
WBC # BLD AUTO: 7.4 THOUSAND/UL (ref 3.8–10.8)

## 2025-06-27 NOTE — PROGRESS NOTES
Subjective   Patient ID: Anna Santos is a 64 y.o. female who presents for No chief complaint on file..  History of Present Illness        PMHx, FHx, Social Hx, Surg Hx personally reviewed at this appointment. No pertinent findings and/or changes from prior (if applicable).    ROS: Unless specified above, pt denies wt gain/loss f/c HA LoC CP SOB NVDC. See HPI above, and scanned sheet (if applicable). All other systems are reviewed and are without complaint.     Objective     LMP  (LMP Unknown)      Physical Exam        Lab Results   Component Value Date    WBC 7.4 06/26/2025    HGB 14.5 06/26/2025    HCT 46.3 (H) 06/26/2025     06/26/2025    CHOL 113 06/26/2025    TRIG 150 (H) 06/26/2025    HDL 33 (L) 06/26/2025    ALT 46 (H) 06/26/2025    AST 32 06/26/2025     (L) 06/26/2025    K 4.5 06/26/2025    CL 98 06/26/2025    CREATININE 1.99 (H) 06/26/2025    BUN 42 (H) 06/26/2025    CO2 23 06/26/2025    TSH 0.64 12/17/2024    INR 1.2 (H) 01/01/2019    HGBA1C 7.7 (A) 06/17/2025     par     Current Outpatient Medications   Medication Instructions    albuterol 90 mcg/actuation inhaler 1 puff, inhalation, Every 6 hours PRN    aspirin 81 mg, Daily RT    atorvastatin (LIPITOR) 80 mg, oral, Daily    blood sugar diagnostic (Contour Next Test Strips) strip CHECK BLOOD SUAR 4-5 TIMES PER DAY FOR INSULIN REQUIRING T2DM    blood-glucose sensor (Dexcom G7 Sensor) device Change every 10 days    blood-glucose sensor (Dexcom G7 Sensor) device Change every 10 days    cholecalciferol (Vitamin D-3) 5,000 Units tablet Take by mouth.    dapagliflozin propanediol (FARXIGA) 10 mg, oral, Daily    Dexcom G6 Transmitter device USE AS DIRECTED    Dexcom G7 Sensor device Change every 10 days    fluticasone (Flonase) 50 mcg/actuation nasal spray 1 spray, 2 times daily    hydroCHLOROthiazide (HYDRODIURIL) 50 mg, oral, Daily    insulin regular (HumuLIN R U-500, Conc, Insulin) 500 unit/mL CONCENTRATED injection subcutaneous, Every morning,  "Take as directed per insulin instructions. Use U-500 insulin syringe.INJECT A TOTAL DAILY DOSE  OF 50 UNITS IN INSULIN PUMP(U500)    insulin syringe-needle U-100 (BD Insulin Syringe Ultra-Fine) 1 mL 30 gauge x 1/2\" syringe No dose, route, or frequency recorded.    Jardiance 25 mg, oral, Daily    magnesium oxide (Mag-Ox) 400 mg (241.3 mg magnesium) tablet 1 tablet, Daily    metoprolol tartrate (Lopressor) 25 mg tablet TAKE 1 TABLET TWICE A DAY    metoprolol tartrate (LOPRESSOR) 25 mg, oral, 2 times daily    ONETOUCH DELICA LANCETS MISC 4 times daily    polyethylene glycol (Miralax) 17 gram/dose powder 1 Dose, Nightly    semaglutide 2 mg, subcutaneous, Every 7 days    spironolactone (ALDACTONE) 50 mg, oral, 2 times daily    traMADol (ULTRAM) 50 mg, Every 4 hours PRN    white petrolatum-mineral oiL (Tears Naturale PM) 94-3 % ophthalmic ointment 1 Application, Both Eyes, Systane x 1 daily OU        OCT, Retina - OU - Both Eyes  Right Eye  Findings include diabetic retinopathy.     Left Eye  Findings include diabetic retinopathy.     Notes  Retinal multimodal imaging including photography was completed, and the   findings are described in the examination.           Assessment & Plan            Albert Hodgson MD       This medical note was created with the assistance of artificial intelligence (AI) for documentation purposes. The content has been reviewed and confirmed by the healthcare provider for accuracy and completeness. Patient consented to the use of audio recording and use of AI during their visit.          "

## 2025-07-24 ENCOUNTER — APPOINTMENT (OUTPATIENT)
Dept: RADIOLOGY | Facility: CLINIC | Age: 65
End: 2025-07-24
Payer: COMMERCIAL

## 2025-07-24 DIAGNOSIS — Z78.0 MENOPAUSE: ICD-10-CM

## 2025-07-24 PROCEDURE — 77080 DXA BONE DENSITY AXIAL: CPT

## 2025-08-04 ENCOUNTER — APPOINTMENT (OUTPATIENT)
Dept: OPHTHALMOLOGY | Facility: CLINIC | Age: 65
End: 2025-08-04
Payer: COMMERCIAL

## 2025-08-04 DIAGNOSIS — E11.3513 PROLIFERATIVE DIABETIC RETINOPATHY OF BOTH EYES WITH MACULAR EDEMA ASSOCIATED WITH TYPE 2 DIABETES MELLITUS: Primary | ICD-10-CM

## 2025-08-04 DIAGNOSIS — E11.3511 PROLIFERATIVE DIABETIC RETINOPATHY OF RIGHT EYE WITH MACULAR EDEMA ASSOCIATED WITH TYPE 2 DIABETES MELLITUS: ICD-10-CM

## 2025-08-04 PROCEDURE — 92134 CPTRZ OPH DX IMG PST SGM RTA: CPT

## 2025-08-04 PROCEDURE — 67028 INJECTION EYE DRUG: CPT | Mod: RIGHT SIDE

## 2025-08-04 ASSESSMENT — SLIT LAMP EXAM - LIDS
COMMENTS: NORMAL
COMMENTS: NORMAL

## 2025-08-04 ASSESSMENT — VISUAL ACUITY
METHOD: SNELLEN - LINEAR
OS_SC+: -2
OD_PH_SC: 20/25
OS_SC: 20/25
OD_SC: 20/30

## 2025-08-04 ASSESSMENT — CUP TO DISC RATIO
OD_RATIO: 0.4
OS_RATIO: 0.4

## 2025-08-04 ASSESSMENT — TONOMETRY
IOP_METHOD: GOLDMANN APPLANATION
OS_IOP_MMHG: 14
OD_IOP_MMHG: 13

## 2025-08-04 ASSESSMENT — EXTERNAL EXAM - RIGHT EYE: OD_EXAM: NORMAL

## 2025-08-04 ASSESSMENT — EXTERNAL EXAM - LEFT EYE: OS_EXAM: NORMAL

## 2025-08-04 NOTE — PROGRESS NOTES
Proliferative diabetic retinopathy with macular edema in type II DM, both eyesE11.7003  Vitreous hemorrhage, right eye - resolved 2/5/25  - Hx of Diabetes 20 years  - Most recent HbA1c = 8.5 12/17/24  - Hx of HTN  - No Hx of kidney disease    S/p PRP and IVT OU (by [facility] in the past)    - OCT 08/04/25   --- Both eyes (OU): Increased retinal thickness, center involving DME.     Plan    - DME OU (OD>OS)  - Emphasized the importance of blood glucose, BP, and cholestrol level control  - Discussed risks/benefits/alteranatives of treatment options. Patient elects to proceed with injections. Consent obtained for IV OU 2/5/25 valid thru 12/31/25.  - VH inferiorly, with incomplete laser PRP; Consider fill-in PRP OD after DME stability   - JULIA#4 OD done - no significant improvement with it. Transition to IVV    - IVV #1 OD done today 8/4/25 return in 5 weeks for follow up and #2.     - RTC 5 weeks

## 2025-08-12 ENCOUNTER — OFFICE VISIT (OUTPATIENT)
Dept: ORTHOPEDIC SURGERY | Facility: HOSPITAL | Age: 65
End: 2025-08-12
Payer: COMMERCIAL

## 2025-08-12 ENCOUNTER — HOSPITAL ENCOUNTER (OUTPATIENT)
Dept: RADIOLOGY | Facility: HOSPITAL | Age: 65
Discharge: HOME | End: 2025-08-12
Payer: COMMERCIAL

## 2025-08-12 VITALS — HEIGHT: 71 IN | WEIGHT: 232 LBS | BODY MASS INDEX: 32.48 KG/M2

## 2025-08-12 DIAGNOSIS — M00.9: Primary | ICD-10-CM

## 2025-08-12 DIAGNOSIS — M79.641 RIGHT HAND PAIN: ICD-10-CM

## 2025-08-12 PROCEDURE — 99204 OFFICE O/P NEW MOD 45 MIN: CPT

## 2025-08-12 PROCEDURE — 73130 X-RAY EXAM OF HAND: CPT | Mod: RIGHT SIDE | Performed by: RADIOLOGY

## 2025-08-12 PROCEDURE — 3051F HG A1C>EQUAL 7.0%<8.0%: CPT

## 2025-08-12 PROCEDURE — 73130 X-RAY EXAM OF HAND: CPT | Mod: RT

## 2025-08-12 PROCEDURE — 3008F BODY MASS INDEX DOCD: CPT

## 2025-08-12 PROCEDURE — 1125F AMNT PAIN NOTED PAIN PRSNT: CPT

## 2025-08-12 PROCEDURE — 99213 OFFICE O/P EST LOW 20 MIN: CPT

## 2025-08-12 PROCEDURE — 1159F MED LIST DOCD IN RCRD: CPT

## 2025-08-12 RX ORDER — AMOXICILLIN AND CLAVULANATE POTASSIUM 875; 125 MG/1; MG/1
875 TABLET, FILM COATED ORAL 2 TIMES DAILY
Qty: 20 TABLET | Refills: 0 | Status: SHIPPED | OUTPATIENT
Start: 2025-08-12 | End: 2025-08-22

## 2025-08-12 ASSESSMENT — PAIN - FUNCTIONAL ASSESSMENT: PAIN_FUNCTIONAL_ASSESSMENT: 0-10

## 2025-08-12 ASSESSMENT — PAIN SCALES - GENERAL: PAINLEVEL_OUTOF10: 4

## 2025-08-18 ENCOUNTER — HOSPITAL ENCOUNTER (EMERGENCY)
Facility: HOSPITAL | Age: 65
Discharge: HOME | End: 2025-08-18
Attending: STUDENT IN AN ORGANIZED HEALTH CARE EDUCATION/TRAINING PROGRAM
Payer: COMMERCIAL

## 2025-08-18 ENCOUNTER — APPOINTMENT (OUTPATIENT)
Dept: RADIOLOGY | Facility: HOSPITAL | Age: 65
End: 2025-08-18
Payer: COMMERCIAL

## 2025-08-18 VITALS
HEART RATE: 77 BPM | SYSTOLIC BLOOD PRESSURE: 137 MMHG | DIASTOLIC BLOOD PRESSURE: 75 MMHG | BODY MASS INDEX: 32.48 KG/M2 | TEMPERATURE: 98.5 F | WEIGHT: 232 LBS | RESPIRATION RATE: 16 BRPM | OXYGEN SATURATION: 98 % | HEIGHT: 71 IN

## 2025-08-18 DIAGNOSIS — M79.644 FINGER PAIN, RIGHT: Primary | ICD-10-CM

## 2025-08-18 LAB
ANION GAP SERPL CALC-SCNC: 11 MMOL/L (ref 10–20)
BASOPHILS # BLD AUTO: 0.04 X10*3/UL (ref 0–0.1)
BASOPHILS NFR BLD AUTO: 0.6 %
BUN SERPL-MCNC: 26 MG/DL (ref 6–23)
CALCIUM SERPL-MCNC: 9.4 MG/DL (ref 8.6–10.3)
CHLORIDE SERPL-SCNC: 100 MMOL/L (ref 98–107)
CO2 SERPL-SCNC: 27 MMOL/L (ref 21–32)
CREAT SERPL-MCNC: 1.54 MG/DL (ref 0.5–1.05)
CRP SERPL-MCNC: 0.29 MG/DL
EGFRCR SERPLBLD CKD-EPI 2021: 37 ML/MIN/1.73M*2
EOSINOPHIL # BLD AUTO: 0.25 X10*3/UL (ref 0–0.7)
EOSINOPHIL NFR BLD AUTO: 3.6 %
ERYTHROCYTE [DISTWIDTH] IN BLOOD BY AUTOMATED COUNT: 14.9 % (ref 11.5–14.5)
ERYTHROCYTE [SEDIMENTATION RATE] IN BLOOD BY WESTERGREN METHOD: 77 MM/H (ref 0–30)
GLUCOSE SERPL-MCNC: 137 MG/DL (ref 74–99)
HCT VFR BLD AUTO: 46.5 % (ref 36–46)
HGB BLD-MCNC: 14.1 G/DL (ref 12–16)
IMM GRANULOCYTES # BLD AUTO: 0.02 X10*3/UL (ref 0–0.7)
IMM GRANULOCYTES NFR BLD AUTO: 0.3 % (ref 0–0.9)
LYMPHOCYTES # BLD AUTO: 2.05 X10*3/UL (ref 1.2–4.8)
LYMPHOCYTES NFR BLD AUTO: 29.6 %
MCH RBC QN AUTO: 23.9 PG (ref 26–34)
MCHC RBC AUTO-ENTMCNC: 30.3 G/DL (ref 32–36)
MCV RBC AUTO: 79 FL (ref 80–100)
MONOCYTES # BLD AUTO: 0.54 X10*3/UL (ref 0.1–1)
MONOCYTES NFR BLD AUTO: 7.8 %
NEUTROPHILS # BLD AUTO: 4.02 X10*3/UL (ref 1.2–7.7)
NEUTROPHILS NFR BLD AUTO: 58.1 %
NRBC BLD-RTO: 0 /100 WBCS (ref 0–0)
PLATELET # BLD AUTO: 188 X10*3/UL (ref 150–450)
POTASSIUM SERPL-SCNC: 4.4 MMOL/L (ref 3.5–5.3)
RBC # BLD AUTO: 5.9 X10*6/UL (ref 4–5.2)
SODIUM SERPL-SCNC: 134 MMOL/L (ref 136–145)
WBC # BLD AUTO: 6.9 X10*3/UL (ref 4.4–11.3)

## 2025-08-18 PROCEDURE — 36415 COLL VENOUS BLD VENIPUNCTURE: CPT | Performed by: PHYSICIAN ASSISTANT

## 2025-08-18 PROCEDURE — 80048 BASIC METABOLIC PNL TOTAL CA: CPT | Performed by: PHYSICIAN ASSISTANT

## 2025-08-18 PROCEDURE — 73130 X-RAY EXAM OF HAND: CPT | Mod: RIGHT SIDE | Performed by: RADIOLOGY

## 2025-08-18 PROCEDURE — 99284 EMERGENCY DEPT VISIT MOD MDM: CPT | Performed by: STUDENT IN AN ORGANIZED HEALTH CARE EDUCATION/TRAINING PROGRAM

## 2025-08-18 PROCEDURE — 73130 X-RAY EXAM OF HAND: CPT | Mod: RT

## 2025-08-18 PROCEDURE — 85652 RBC SED RATE AUTOMATED: CPT | Performed by: PHYSICIAN ASSISTANT

## 2025-08-18 PROCEDURE — 85025 COMPLETE CBC W/AUTO DIFF WBC: CPT | Performed by: PHYSICIAN ASSISTANT

## 2025-08-18 PROCEDURE — 86140 C-REACTIVE PROTEIN: CPT | Performed by: PHYSICIAN ASSISTANT

## 2025-08-18 ASSESSMENT — PAIN - FUNCTIONAL ASSESSMENT: PAIN_FUNCTIONAL_ASSESSMENT: 0-10

## 2025-08-18 ASSESSMENT — PAIN SCALES - GENERAL: PAINLEVEL_OUTOF10: 0 - NO PAIN

## 2025-08-19 ENCOUNTER — DOCUMENTATION (OUTPATIENT)
Dept: ORTHOPEDIC SURGERY | Facility: HOSPITAL | Age: 65
End: 2025-08-19
Payer: COMMERCIAL

## 2025-08-21 ENCOUNTER — DOCUMENTATION (OUTPATIENT)
Dept: ORTHOPEDIC SURGERY | Facility: HOSPITAL | Age: 65
End: 2025-08-21
Payer: COMMERCIAL

## 2025-09-15 ENCOUNTER — APPOINTMENT (OUTPATIENT)
Dept: OPHTHALMOLOGY | Facility: CLINIC | Age: 65
End: 2025-09-15
Payer: COMMERCIAL

## 2025-11-13 ENCOUNTER — APPOINTMENT (OUTPATIENT)
Dept: ENDOCRINOLOGY | Facility: CLINIC | Age: 65
End: 2025-11-13
Payer: COMMERCIAL